# Patient Record
Sex: FEMALE | Race: WHITE | Employment: OTHER | URBAN - METROPOLITAN AREA
[De-identification: names, ages, dates, MRNs, and addresses within clinical notes are randomized per-mention and may not be internally consistent; named-entity substitution may affect disease eponyms.]

---

## 2017-02-16 ENCOUNTER — TELEPHONE (OUTPATIENT)
Dept: INTERNAL MEDICINE CLINIC | Age: 67
End: 2017-02-16

## 2017-02-16 DIAGNOSIS — G25.81 RLS (RESTLESS LEGS SYNDROME): ICD-10-CM

## 2017-02-16 DIAGNOSIS — F40.243 PHOBIA, FLYING: ICD-10-CM

## 2017-02-16 DIAGNOSIS — G89.29 CHRONIC BILATERAL LOW BACK PAIN WITHOUT SCIATICA: ICD-10-CM

## 2017-02-16 DIAGNOSIS — M54.50 CHRONIC BILATERAL LOW BACK PAIN WITHOUT SCIATICA: ICD-10-CM

## 2017-02-17 RX ORDER — ALPRAZOLAM 0.25 MG/1
TABLET ORAL
Qty: 20 TABLET | Refills: 0 | Status: SHIPPED | OUTPATIENT
Start: 2017-02-17 | End: 2017-05-31 | Stop reason: ALTCHOICE

## 2017-02-17 RX ORDER — ALPRAZOLAM 0.25 MG/1
TABLET ORAL
Qty: 10 TABLET | Refills: 0 | Status: SHIPPED | OUTPATIENT
Start: 2017-02-17 | End: 2017-02-17 | Stop reason: CLARIF

## 2017-02-17 RX ORDER — TRAMADOL HYDROCHLORIDE 50 MG/1
TABLET ORAL
Qty: 60 TABLET | Refills: 1 | Status: SHIPPED | OUTPATIENT
Start: 2017-02-17 | End: 2017-05-31 | Stop reason: SDUPTHER

## 2017-05-20 DIAGNOSIS — E78.5 HYPERLIPIDEMIA: ICD-10-CM

## 2017-05-22 RX ORDER — PRAVASTATIN SODIUM 40 MG
TABLET ORAL
Qty: 30 TABLET | Refills: 4 | Status: SHIPPED | OUTPATIENT
Start: 2017-05-22 | End: 2018-02-24 | Stop reason: SDUPTHER

## 2017-05-31 ENCOUNTER — OFFICE VISIT (OUTPATIENT)
Dept: INTERNAL MEDICINE CLINIC | Age: 67
End: 2017-05-31

## 2017-05-31 VITALS
BODY MASS INDEX: 17.87 KG/M2 | RESPIRATION RATE: 12 BRPM | HEART RATE: 83 BPM | WEIGHT: 91 LBS | DIASTOLIC BLOOD PRESSURE: 63 MMHG | HEIGHT: 60 IN | SYSTOLIC BLOOD PRESSURE: 132 MMHG

## 2017-05-31 DIAGNOSIS — M19.042 PRIMARY OSTEOARTHRITIS OF BOTH HANDS: ICD-10-CM

## 2017-05-31 DIAGNOSIS — R73.9 HYPERGLYCEMIA: ICD-10-CM

## 2017-05-31 DIAGNOSIS — Z13.820 SCREENING FOR OSTEOPOROSIS: ICD-10-CM

## 2017-05-31 DIAGNOSIS — Z23 NEED FOR PROPHYLACTIC VACCINATION AGAINST STREPTOCOCCUS PNEUMONIAE (PNEUMOCOCCUS): ICD-10-CM

## 2017-05-31 DIAGNOSIS — M47.816 SPONDYLOSIS OF LUMBAR REGION WITHOUT MYELOPATHY OR RADICULOPATHY: ICD-10-CM

## 2017-05-31 DIAGNOSIS — E78.5 HYPERLIPIDEMIA, UNSPECIFIED HYPERLIPIDEMIA TYPE: ICD-10-CM

## 2017-05-31 DIAGNOSIS — Z82.49 FAMILY HISTORY OF EARLY CAD: ICD-10-CM

## 2017-05-31 DIAGNOSIS — M54.50 CHRONIC BILATERAL LOW BACK PAIN WITHOUT SCIATICA: ICD-10-CM

## 2017-05-31 DIAGNOSIS — G25.81 RLS (RESTLESS LEGS SYNDROME): ICD-10-CM

## 2017-05-31 DIAGNOSIS — M19.041 PRIMARY OSTEOARTHRITIS OF BOTH HANDS: ICD-10-CM

## 2017-05-31 DIAGNOSIS — M85.80 OSTEOPENIA: ICD-10-CM

## 2017-05-31 DIAGNOSIS — Z00.00 ANNUAL PHYSICAL EXAM: Primary | ICD-10-CM

## 2017-05-31 DIAGNOSIS — G89.29 CHRONIC BILATERAL LOW BACK PAIN WITHOUT SCIATICA: ICD-10-CM

## 2017-05-31 LAB
A/G RATIO: 1.6 (ref 1.1–2.2)
ALBUMIN SERPL-MCNC: 4.6 G/DL (ref 3.4–5)
ALP BLD-CCNC: 90 U/L (ref 40–129)
ALT SERPL-CCNC: 14 U/L (ref 10–40)
ANION GAP SERPL CALCULATED.3IONS-SCNC: 14 MMOL/L (ref 3–16)
AST SERPL-CCNC: 21 U/L (ref 15–37)
BILIRUB SERPL-MCNC: <0.2 MG/DL (ref 0–1)
BUN BLDV-MCNC: 21 MG/DL (ref 7–20)
C-REACTIVE PROTEIN: <0.3 MG/L (ref 0–5.1)
CALCIUM SERPL-MCNC: 9.6 MG/DL (ref 8.3–10.6)
CHLORIDE BLD-SCNC: 98 MMOL/L (ref 99–110)
CHOLESTEROL, TOTAL: 244 MG/DL (ref 0–199)
CO2: 30 MMOL/L (ref 21–32)
CREAT SERPL-MCNC: 0.6 MG/DL (ref 0.6–1.2)
GFR AFRICAN AMERICAN: >60
GFR NON-AFRICAN AMERICAN: >60
GLOBULIN: 2.9 G/DL
GLUCOSE BLD-MCNC: 89 MG/DL (ref 70–99)
HDLC SERPL-MCNC: 109 MG/DL (ref 40–60)
LDL CHOLESTEROL CALCULATED: 119 MG/DL
POTASSIUM SERPL-SCNC: 4.4 MMOL/L (ref 3.5–5.1)
SODIUM BLD-SCNC: 142 MMOL/L (ref 136–145)
TOTAL PROTEIN: 7.5 G/DL (ref 6.4–8.2)
TRIGL SERPL-MCNC: 81 MG/DL (ref 0–150)
TSH SERPL DL<=0.05 MIU/L-ACNC: 1.36 UIU/ML (ref 0.27–4.2)
VITAMIN D 25-HYDROXY: 54.7 NG/ML
VLDLC SERPL CALC-MCNC: 16 MG/DL

## 2017-05-31 PROCEDURE — 90670 PCV13 VACCINE IM: CPT | Performed by: INTERNAL MEDICINE

## 2017-05-31 PROCEDURE — G0403 EKG FOR INITIAL PREVENT EXAM: HCPCS | Performed by: INTERNAL MEDICINE

## 2017-05-31 PROCEDURE — G0402 INITIAL PREVENTIVE EXAM: HCPCS | Performed by: INTERNAL MEDICINE

## 2017-05-31 PROCEDURE — G0009 ADMIN PNEUMOCOCCAL VACCINE: HCPCS | Performed by: INTERNAL MEDICINE

## 2017-05-31 RX ORDER — TRAMADOL HYDROCHLORIDE 50 MG/1
TABLET ORAL
Qty: 60 TABLET | Refills: 1 | Status: SHIPPED | OUTPATIENT
Start: 2017-05-31 | End: 2017-08-29 | Stop reason: SDUPTHER

## 2017-05-31 ASSESSMENT — LIFESTYLE VARIABLES
HOW OFTEN DURING THE LAST YEAR HAVE YOU FOUND THAT YOU WERE NOT ABLE TO STOP DRINKING ONCE YOU HAD STARTED: 0
HOW OFTEN DURING THE LAST YEAR HAVE YOU NEEDED AN ALCOHOLIC DRINK FIRST THING IN THE MORNING TO GET YOURSELF GOING AFTER A NIGHT OF HEAVY DRINKING: 0
HAVE YOU OR SOMEONE ELSE BEEN INJURED AS A RESULT OF YOUR DRINKING: 0
HOW OFTEN DO YOU HAVE SIX OR MORE DRINKS ON ONE OCCASION: 0
HOW OFTEN DURING THE LAST YEAR HAVE YOU HAD A FEELING OF GUILT OR REMORSE AFTER DRINKING: 0
AUDIT TOTAL SCORE: 2
HOW MANY STANDARD DRINKS CONTAINING ALCOHOL DO YOU HAVE ON A TYPICAL DAY: 0
HOW OFTEN DO YOU HAVE A DRINK CONTAINING ALCOHOL: 2
AUDIT-C TOTAL SCORE: 2
HAS A RELATIVE, FRIEND, DOCTOR, OR ANOTHER HEALTH PROFESSIONAL EXPRESSED CONCERN ABOUT YOUR DRINKING OR SUGGESTED YOU CUT DOWN: 0
HOW OFTEN DURING THE LAST YEAR HAVE YOU BEEN UNABLE TO REMEMBER WHAT HAPPENED THE NIGHT BEFORE BECAUSE YOU HAD BEEN DRINKING: 0
HOW OFTEN DURING THE LAST YEAR HAVE YOU FAILED TO DO WHAT WAS NORMALLY EXPECTED FROM YOU BECAUSE OF DRINKING: 0

## 2017-05-31 ASSESSMENT — PATIENT HEALTH QUESTIONNAIRE - PHQ9: SUM OF ALL RESPONSES TO PHQ QUESTIONS 1-9: 0

## 2017-05-31 ASSESSMENT — ANXIETY QUESTIONNAIRES: GAD7 TOTAL SCORE: 0

## 2017-06-01 LAB
ESTIMATED AVERAGE GLUCOSE: 116.9 MG/DL
HBA1C MFR BLD: 5.7 %

## 2017-06-16 DIAGNOSIS — F40.243 PHOBIA, FLYING: ICD-10-CM

## 2017-06-19 RX ORDER — ALPRAZOLAM 0.25 MG/1
TABLET ORAL
Qty: 10 TABLET | Refills: 0 | Status: SHIPPED | OUTPATIENT
Start: 2017-06-19 | End: 2017-08-17 | Stop reason: SDUPTHER

## 2017-08-17 ENCOUNTER — TELEPHONE (OUTPATIENT)
Dept: INTERNAL MEDICINE CLINIC | Age: 67
End: 2017-08-17

## 2017-08-17 DIAGNOSIS — F40.243 PHOBIA, FLYING: ICD-10-CM

## 2017-08-18 RX ORDER — ALPRAZOLAM 0.25 MG/1
TABLET ORAL
Qty: 10 TABLET | Refills: 0 | Status: SHIPPED | OUTPATIENT
Start: 2017-08-18 | End: 2018-09-17

## 2017-08-29 DIAGNOSIS — G89.29 CHRONIC BILATERAL LOW BACK PAIN WITHOUT SCIATICA: ICD-10-CM

## 2017-08-29 DIAGNOSIS — G25.81 RLS (RESTLESS LEGS SYNDROME): ICD-10-CM

## 2017-08-29 DIAGNOSIS — M54.50 CHRONIC BILATERAL LOW BACK PAIN WITHOUT SCIATICA: ICD-10-CM

## 2017-08-29 DIAGNOSIS — M47.816 SPONDYLOSIS OF LUMBAR REGION WITHOUT MYELOPATHY OR RADICULOPATHY: ICD-10-CM

## 2017-08-31 RX ORDER — TRAMADOL HYDROCHLORIDE 50 MG/1
TABLET ORAL
Qty: 60 TABLET | Refills: 0 | Status: SHIPPED | OUTPATIENT
Start: 2017-08-31 | End: 2017-10-24 | Stop reason: SDUPTHER

## 2017-10-05 ENCOUNTER — TELEPHONE (OUTPATIENT)
Dept: INTERNAL MEDICINE CLINIC | Age: 67
End: 2017-10-05
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
## 2017-10-06 ENCOUNTER — OFFICE VISIT (OUTPATIENT)
Dept: INTERNAL MEDICINE CLINIC | Age: 67
End: 2017-10-06

## 2017-10-06 VITALS — SYSTOLIC BLOOD PRESSURE: 120 MMHG | DIASTOLIC BLOOD PRESSURE: 80 MMHG | BODY MASS INDEX: 18.27 KG/M2 | WEIGHT: 92 LBS

## 2017-10-06 DIAGNOSIS — S39.012A LUMBAR STRAIN, INITIAL ENCOUNTER: ICD-10-CM

## 2017-10-06 DIAGNOSIS — M99.03 LUMBAR REGION SOMATIC DYSFUNCTION: ICD-10-CM

## 2017-10-06 DIAGNOSIS — S16.1XXA CERVICAL STRAIN, INITIAL ENCOUNTER: Primary | ICD-10-CM

## 2017-10-06 PROCEDURE — 99213 OFFICE O/P EST LOW 20 MIN: CPT | Performed by: INTERNAL MEDICINE
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
## 2017-10-24 DIAGNOSIS — G89.29 CHRONIC BILATERAL LOW BACK PAIN WITHOUT SCIATICA: ICD-10-CM

## 2017-10-24 DIAGNOSIS — G25.81 RLS (RESTLESS LEGS SYNDROME): ICD-10-CM

## 2017-10-24 DIAGNOSIS — M47.816 SPONDYLOSIS OF LUMBAR REGION WITHOUT MYELOPATHY OR RADICULOPATHY: ICD-10-CM

## 2017-10-24 DIAGNOSIS — M54.50 CHRONIC BILATERAL LOW BACK PAIN WITHOUT SCIATICA: ICD-10-CM

## 2017-10-24 RX ORDER — TRAMADOL HYDROCHLORIDE 50 MG/1
TABLET ORAL
Qty: 60 TABLET | Refills: 0 | Status: SHIPPED | OUTPATIENT
Start: 2017-10-24 | End: 2017-12-12 | Stop reason: SDUPTHER

## 2017-12-12 ENCOUNTER — OFFICE VISIT (OUTPATIENT)
Dept: INTERNAL MEDICINE CLINIC | Age: 67
End: 2017-12-12

## 2017-12-12 VITALS
BODY MASS INDEX: 18.26 KG/M2 | RESPIRATION RATE: 12 BRPM | HEART RATE: 73 BPM | WEIGHT: 93 LBS | SYSTOLIC BLOOD PRESSURE: 130 MMHG | HEIGHT: 60 IN | DIASTOLIC BLOOD PRESSURE: 72 MMHG

## 2017-12-12 DIAGNOSIS — E78.5 HYPERLIPIDEMIA, UNSPECIFIED HYPERLIPIDEMIA TYPE: ICD-10-CM

## 2017-12-12 DIAGNOSIS — M47.816 SPONDYLOSIS OF LUMBAR REGION WITHOUT MYELOPATHY OR RADICULOPATHY: ICD-10-CM

## 2017-12-12 DIAGNOSIS — G89.29 CHRONIC BILATERAL LOW BACK PAIN WITHOUT SCIATICA: Primary | ICD-10-CM

## 2017-12-12 DIAGNOSIS — Z23 FLU VACCINE NEED: ICD-10-CM

## 2017-12-12 DIAGNOSIS — M54.50 CHRONIC BILATERAL LOW BACK PAIN WITHOUT SCIATICA: Primary | ICD-10-CM

## 2017-12-12 DIAGNOSIS — G25.81 RLS (RESTLESS LEGS SYNDROME): ICD-10-CM

## 2017-12-12 PROCEDURE — 99214 OFFICE O/P EST MOD 30 MIN: CPT | Performed by: INTERNAL MEDICINE

## 2017-12-12 PROCEDURE — G0008 ADMIN INFLUENZA VIRUS VAC: HCPCS | Performed by: INTERNAL MEDICINE

## 2017-12-12 PROCEDURE — 90662 IIV NO PRSV INCREASED AG IM: CPT | Performed by: INTERNAL MEDICINE

## 2017-12-12 RX ORDER — TRAMADOL HYDROCHLORIDE 50 MG/1
TABLET ORAL
Qty: 60 TABLET | Refills: 2 | Status: SHIPPED | OUTPATIENT
Start: 2017-12-12 | End: 2018-05-26 | Stop reason: SDUPTHER

## 2017-12-12 NOTE — PATIENT INSTRUCTIONS
Patient Education        Back Care and Preventing Injuries: Care Instructions  Your Care Instructions    You can hurt your back doing many everyday activities: lifting a heavy box, bending down to garden, exercising at the gym, and even getting out of bed. But you can keep your back strong and healthy by doing some exercises. You also can follow a few tips for sitting, sleeping, and lifting to avoid hurting your back again. Talk to your doctor before you start an exercise program. Ask for help if you want to learn more about keeping your back healthy. Follow-up care is a key part of your treatment and safety. Be sure to make and go to all appointments, and call your doctor if you are having problems. It's also a good idea to know your test results and keep a list of the medicines you take. How can you care for yourself at home? · Stay at a healthy weight to avoid strain on your lower back. · Do not smoke. Smoking increases the risk of osteoporosis, which weakens the spine. If you need help quitting, talk to your doctor about stop-smoking programs and medicines. These can increase your chances of quitting for good. · Make sure you sleep in a position that maintains your back's normal curves and on a mattress that feels comfortable. Sleep on your side with a pillow between your knees, or sleep on your back with a pillow under your knees. These positions can reduce strain on your back. · When you get out of bed, lie on your side and bend both knees. Drop your feet over the edge of the bed as you push up with both arms. Scoot to the edge of the bed. Make sure your feet are in line with your rear end (buttocks), and then stand up. · If you must stand for a long time, put one foot on a stool, ledge, or box. Exercise to strengthen your back and other muscles  · Get at least 30 minutes of exercise on most days of the week. Walking is a good choice.  You also may want to do other activities, such as running,

## 2017-12-12 NOTE — PROGRESS NOTES
[facility]) Physicians  Internal Medicine  Patient Encounter  Noe Traore D.O., [facility]        Chief Complaint   Patient presents with   [place]    Medication Check       HPI: 74 y.o. female seen today for follow up regarding the status of her current chronic medical problems listed below along with the medication check. She went to urgent care over the weekend for a sinus infection. Strep and Flu tests were Neg. She was given a Z-suri. Chronic low back pain-- pain generators Lumbar facet arthropathy. Patient continues to do home back exercises on a regular basis along with a daily tramadol. She also does Yoga. She also uses Aleve on occasion which provides good relief. She denies any problems with radiculopathy, bladder or bowel incontinence. OARRS appropriate 11/22/2016. RLS-- Somewhere along the way tramadol was given to her for pain, but helped her restless legs. Interestingly, the tramadol does help her restless legs. She takes this once nightly. She's had no problems with misuse or abuse or diversion. Hyperlipidemia:    Lab Results   Component Value Date    LDLCALC 119 (H) 05/31/2017   agent is on statin therapy due to her cardiovascular risk. Father had coronary disease early in life. She denies any new development of myalgias, unexplained muscle weakness or muscle cramping. She does try to adhere to a low-fat diet.   He will have lab done at physical.            Past Medical History:   Diagnosis Date    Arthropathy of cervical facet joint     Cervical spondylosis     Chronic bilateral low back pain without sciatica 11/28/2016    Hyperlipidemia     Lumbar facet arthropathy     Osteopenia     PONV (postoperative nausea and vomiting)     RLS (restless legs syndrome)     Scoliosis     Spondylolisthesis        Review of Systems - As per HPI      OBJECTIVE:  /72   Pulse 73   Resp 12   Ht 4' 11.5\" (1.511 m)   Wt 93 lb (42.2 kg)   BMI 18.47 kg/m²   GEN: NAD, A&O, Non-toxic  HEENT: NC/AT, SUZE, EOMI, Oral cavity Clear,  TM's NL, throat normal.  Nasal cavity is clear without purulent discharge  NECK: Supple. No thyromegaly. No JVD. LYMPH: No C/SC nodes. CV: Regular rhythm. Rate normal.  No murmurs. PULM: CTA  EXT: No edema. GI: Abdomen is soft, NT  NEURO: No focal or lateralizing deficits. Cranial nerves II through XII are intact  VASC:  No carotid bruits. Pulses symmetric  MS:  Increased soft tissue tone of the Lumbar paraspinals. Gait NL. No reproducible TTP. ASSESSMENT/PLAN:    1. Chronic bilateral low back pain without sciatica  Condition is stable and well-controlled  Continue home back stretching program  Continue tramadol once daily  Continue Aleve as needed  Continue to observe for worsening pain or radiculopathy    2. RLS (restless legs syndrome)  Condition is well controlled  Continue tramadol nightly  Continue to observe for worsening RLS symptoms    3. Hyperlipidemia, unspecified hyperlipidemia type  Condition is well controlled  Continue pravastatin  Continue aggressive lifestyle approach with low-fat food options and exercise. 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 Flu vaccine need    - INFLUENZA, HIGH DOSE, 65 YRS +, IM, PF, PREFILL SYR, 0.5ML (FLUZONE HD)        Controlled Substances Monitoring:     Attestation: The Prescription Monitoring Report for this patient was reviewed today. (Xavier Church, DO)  Documentation: No signs of potential drug abuse or diversion identified. , Possible medication side effects, risk of tolerance and/or dependence, and alternative treatments discussed.  ([id number], DO)

## 2018-05-26 DIAGNOSIS — M47.816 SPONDYLOSIS OF LUMBAR REGION WITHOUT MYELOPATHY OR RADICULOPATHY: ICD-10-CM

## 2018-05-26 DIAGNOSIS — G89.29 CHRONIC BILATERAL LOW BACK PAIN WITHOUT SCIATICA: ICD-10-CM

## 2018-05-26 DIAGNOSIS — G25.81 RLS (RESTLESS LEGS SYNDROME): ICD-10-CM

## 2018-05-26 DIAGNOSIS — M54.50 CHRONIC BILATERAL LOW BACK PAIN WITHOUT SCIATICA: ICD-10-CM

## 2018-05-29 RX ORDER — TRAMADOL HYDROCHLORIDE 50 MG/1
TABLET ORAL
Qty: 60 TABLET | Refills: 1 | Status: SHIPPED | OUTPATIENT
Start: 2018-05-29 | End: 2018-08-26 | Stop reason: SDUPTHER

## 2018-06-12 ENCOUNTER — OFFICE VISIT (OUTPATIENT)
Dept: INTERNAL MEDICINE CLINIC | Age: 68
End: 2018-06-12

## 2018-06-12 VITALS
HEART RATE: 68 BPM | SYSTOLIC BLOOD PRESSURE: 120 MMHG | DIASTOLIC BLOOD PRESSURE: 62 MMHG | RESPIRATION RATE: 12 BRPM | BODY MASS INDEX: 18.55 KG/M2 | WEIGHT: 92 LBS | HEIGHT: 59 IN

## 2018-06-12 DIAGNOSIS — E78.5 HYPERLIPIDEMIA, UNSPECIFIED HYPERLIPIDEMIA TYPE: ICD-10-CM

## 2018-06-12 DIAGNOSIS — M41.9 SCOLIOSIS OF THORACOLUMBAR SPINE, UNSPECIFIED SCOLIOSIS TYPE: ICD-10-CM

## 2018-06-12 DIAGNOSIS — Z23 NEED FOR PROPHYLACTIC VACCINATION AGAINST STREPTOCOCCUS PNEUMONIAE (PNEUMOCOCCUS): ICD-10-CM

## 2018-06-12 DIAGNOSIS — Z78.0 POSTMENOPAUSAL: ICD-10-CM

## 2018-06-12 DIAGNOSIS — M85.80 OSTEOPENIA, UNSPECIFIED LOCATION: ICD-10-CM

## 2018-06-12 DIAGNOSIS — Z23 NEED FOR PROPHYLACTIC VACCINATION AND INOCULATION AGAINST VARICELLA: ICD-10-CM

## 2018-06-12 DIAGNOSIS — Z00.00 ROUTINE GENERAL MEDICAL EXAMINATION AT A HEALTH CARE FACILITY: Primary | ICD-10-CM

## 2018-06-12 DIAGNOSIS — R73.01 IMPAIRED FASTING GLUCOSE: ICD-10-CM

## 2018-06-12 LAB
A/G RATIO: 2 (ref 1.1–2.2)
ALBUMIN SERPL-MCNC: 4.8 G/DL (ref 3.4–5)
ALP BLD-CCNC: 95 U/L (ref 40–129)
ALT SERPL-CCNC: 16 U/L (ref 10–40)
ANION GAP SERPL CALCULATED.3IONS-SCNC: 13 MMOL/L (ref 3–16)
AST SERPL-CCNC: 26 U/L (ref 15–37)
BASOPHILS ABSOLUTE: 0 K/UL (ref 0–0.2)
BASOPHILS RELATIVE PERCENT: 0.7 %
BILIRUB SERPL-MCNC: 0.3 MG/DL (ref 0–1)
BUN BLDV-MCNC: 19 MG/DL (ref 7–20)
CALCIUM SERPL-MCNC: 10.1 MG/DL (ref 8.3–10.6)
CHLORIDE BLD-SCNC: 100 MMOL/L (ref 99–110)
CHOLESTEROL, TOTAL: 250 MG/DL (ref 0–199)
CO2: 30 MMOL/L (ref 21–32)
CREAT SERPL-MCNC: 0.7 MG/DL (ref 0.6–1.2)
EOSINOPHILS ABSOLUTE: 0.2 K/UL (ref 0–0.6)
EOSINOPHILS RELATIVE PERCENT: 3.1 %
GFR AFRICAN AMERICAN: >60
GFR NON-AFRICAN AMERICAN: >60
GLOBULIN: 2.4 G/DL
GLUCOSE BLD-MCNC: 94 MG/DL (ref 70–99)
HCT VFR BLD CALC: 41.6 % (ref 36–48)
HDLC SERPL-MCNC: 131 MG/DL (ref 40–60)
HEMOGLOBIN: 14.4 G/DL (ref 12–16)
LDL CHOLESTEROL CALCULATED: 96 MG/DL
LYMPHOCYTES ABSOLUTE: 1.5 K/UL (ref 1–5.1)
LYMPHOCYTES RELATIVE PERCENT: 23.1 %
MCH RBC QN AUTO: 32.9 PG (ref 26–34)
MCHC RBC AUTO-ENTMCNC: 34.5 G/DL (ref 31–36)
MCV RBC AUTO: 95.2 FL (ref 80–100)
MONOCYTES ABSOLUTE: 0.5 K/UL (ref 0–1.3)
MONOCYTES RELATIVE PERCENT: 7 %
NEUTROPHILS ABSOLUTE: 4.4 K/UL (ref 1.7–7.7)
NEUTROPHILS RELATIVE PERCENT: 66.1 %
PDW BLD-RTO: 14 % (ref 12.4–15.4)
PLATELET # BLD: 325 K/UL (ref 135–450)
PMV BLD AUTO: 8 FL (ref 5–10.5)
POTASSIUM SERPL-SCNC: 4 MMOL/L (ref 3.5–5.1)
RBC # BLD: 4.37 M/UL (ref 4–5.2)
SODIUM BLD-SCNC: 143 MMOL/L (ref 136–145)
TOTAL PROTEIN: 7.2 G/DL (ref 6.4–8.2)
TRIGL SERPL-MCNC: 113 MG/DL (ref 0–150)
TSH SERPL DL<=0.05 MIU/L-ACNC: 1.48 UIU/ML (ref 0.27–4.2)
VLDLC SERPL CALC-MCNC: 23 MG/DL
WBC # BLD: 6.6 K/UL (ref 4–11)

## 2018-06-12 PROCEDURE — G0438 PPPS, INITIAL VISIT: HCPCS | Performed by: INTERNAL MEDICINE

## 2018-06-12 PROCEDURE — 90732 PPSV23 VACC 2 YRS+ SUBQ/IM: CPT | Performed by: INTERNAL MEDICINE

## 2018-06-12 PROCEDURE — G0009 ADMIN PNEUMOCOCCAL VACCINE: HCPCS | Performed by: INTERNAL MEDICINE

## 2018-06-12 RX ORDER — PRAVASTATIN SODIUM 20 MG
20 TABLET ORAL NIGHTLY
COMMUNITY
End: 2019-02-21 | Stop reason: SDUPTHER

## 2018-06-12 ASSESSMENT — LIFESTYLE VARIABLES
HOW OFTEN DO YOU HAVE SIX OR MORE DRINKS ON ONE OCCASION: 0
HOW OFTEN DURING THE LAST YEAR HAVE YOU FOUND THAT YOU WERE NOT ABLE TO STOP DRINKING ONCE YOU HAD STARTED: 0
HOW OFTEN DURING THE LAST YEAR HAVE YOU BEEN UNABLE TO REMEMBER WHAT HAPPENED THE NIGHT BEFORE BECAUSE YOU HAD BEEN DRINKING: 0
HAS A RELATIVE, FRIEND, DOCTOR, OR ANOTHER HEALTH PROFESSIONAL EXPRESSED CONCERN ABOUT YOUR DRINKING OR SUGGESTED YOU CUT DOWN: 0
HAVE YOU OR SOMEONE ELSE BEEN INJURED AS A RESULT OF YOUR DRINKING: 0
HOW OFTEN DURING THE LAST YEAR HAVE YOU HAD A FEELING OF GUILT OR REMORSE AFTER DRINKING: 0
HOW OFTEN DURING THE LAST YEAR HAVE YOU FAILED TO DO WHAT WAS NORMALLY EXPECTED FROM YOU BECAUSE OF DRINKING: 0
HOW OFTEN DURING THE LAST YEAR HAVE YOU NEEDED AN ALCOHOLIC DRINK FIRST THING IN THE MORNING TO GET YOURSELF GOING AFTER A NIGHT OF HEAVY DRINKING: 0
HOW OFTEN DO YOU HAVE A DRINK CONTAINING ALCOHOL: 3
HOW MANY STANDARD DRINKS CONTAINING ALCOHOL DO YOU HAVE ON A TYPICAL DAY: 0
AUDIT-C TOTAL SCORE: 3
AUDIT TOTAL SCORE: 3

## 2018-06-12 ASSESSMENT — PATIENT HEALTH QUESTIONNAIRE - PHQ9: SUM OF ALL RESPONSES TO PHQ QUESTIONS 1-9: 0

## 2018-06-12 ASSESSMENT — ANXIETY QUESTIONNAIRES: GAD7 TOTAL SCORE: 0

## 2018-06-13 LAB
ESTIMATED AVERAGE GLUCOSE: 111.2 MG/DL
HBA1C MFR BLD: 5.5 %

## 2018-06-27 ENCOUNTER — HOSPITAL ENCOUNTER (OUTPATIENT)
Dept: GENERAL RADIOLOGY | Age: 68
Discharge: HOME | End: 2018-06-27
Attending: INTERNAL MEDICINE | Admitting: INTERNAL MEDICINE

## 2018-06-27 DIAGNOSIS — Z78.0 POSTMENOPAUSAL: ICD-10-CM

## 2018-06-27 DIAGNOSIS — M85.80 OSTEOPENIA, UNSPECIFIED LOCATION: ICD-10-CM

## 2018-06-27 DIAGNOSIS — M85.80 OTHER SPECIFIED DISORDERS OF BONE DENSITY AND STRUCTURE, UNSPECIFIED SITE (CODE): ICD-10-CM

## 2018-06-29 ENCOUNTER — HOSPITAL ENCOUNTER (OUTPATIENT)
Dept: GENERAL RADIOLOGY | Age: 68
Discharge: HOME | End: 2018-06-29
Attending: INTERNAL MEDICINE | Admitting: INTERNAL MEDICINE

## 2018-06-29 DIAGNOSIS — M85.80 OTHER SPECIFIED DISORDERS OF BONE DENSITY AND STRUCTURE, UNSPECIFIED SITE (CODE): ICD-10-CM

## 2018-06-29 DIAGNOSIS — Z78.0 POSTMENOPAUSAL: ICD-10-CM

## 2018-07-02 ENCOUNTER — PATIENT MESSAGE (OUTPATIENT)
Dept: INTERNAL MEDICINE CLINIC | Age: 68
End: 2018-07-02

## 2018-07-02 DIAGNOSIS — M81.0 OSTEOPOROSIS, POSTMENOPAUSAL: Primary | ICD-10-CM

## 2018-07-02 DIAGNOSIS — M85.80 OSTEOPENIA, UNSPECIFIED LOCATION: ICD-10-CM

## 2018-07-02 NOTE — TELEPHONE ENCOUNTER
From: Katia Gilmore  Sent: 7/2/2018 1:33 PM EDT  To: [facility]  Subject: RE:    Is there a holistic approach to this without going on osteoporosis meds? I am open to seeing a specialist in this field.    ----- Message -----  From: Blima Apley, DO  Sent: 7/2/18, 8:02 AM  To: Katia Gilmore  Subject:     Rosa M,  Your bone density test shows significant worsening of bone density. You have osteoporosis and osteopenia. He should reconsider medical therapy to help reduce your risk of fracture.   Call or e-mail with questions   Dr. Garcia Pore

## 2018-07-27 ENCOUNTER — TELEPHONE (OUTPATIENT)
Dept: INTERNAL MEDICINE CLINIC | Age: 68
End: 2018-07-27

## 2018-08-26 DIAGNOSIS — M47.816 SPONDYLOSIS OF LUMBAR REGION WITHOUT MYELOPATHY OR RADICULOPATHY: ICD-10-CM

## 2018-08-26 DIAGNOSIS — G89.29 CHRONIC BILATERAL LOW BACK PAIN WITHOUT SCIATICA: ICD-10-CM

## 2018-08-26 DIAGNOSIS — M54.50 CHRONIC BILATERAL LOW BACK PAIN WITHOUT SCIATICA: ICD-10-CM

## 2018-08-26 DIAGNOSIS — G25.81 RLS (RESTLESS LEGS SYNDROME): ICD-10-CM

## 2018-08-28 RX ORDER — TRAMADOL HYDROCHLORIDE 50 MG/1
TABLET ORAL
Qty: 60 TABLET | Refills: 0 | Status: SHIPPED | OUTPATIENT
Start: 2018-08-28 | End: 2018-10-03 | Stop reason: SDUPTHER

## 2018-09-17 ENCOUNTER — OFFICE VISIT (OUTPATIENT)
Dept: ENDOCRINOLOGY | Age: 68
End: 2018-09-17

## 2018-09-17 VITALS
HEIGHT: 59 IN | SYSTOLIC BLOOD PRESSURE: 124 MMHG | BODY MASS INDEX: 18.59 KG/M2 | WEIGHT: 92.2 LBS | DIASTOLIC BLOOD PRESSURE: 88 MMHG | OXYGEN SATURATION: 97 % | HEART RATE: 87 BPM

## 2018-09-17 DIAGNOSIS — M81.0 AGE-RELATED OSTEOPOROSIS WITHOUT CURRENT PATHOLOGICAL FRACTURE: Primary | ICD-10-CM

## 2018-09-17 DIAGNOSIS — Z84.89 FAMILY HISTORY OF HIP FRACTURE: ICD-10-CM

## 2018-09-17 DIAGNOSIS — K21.9 GASTROESOPHAGEAL REFLUX DISEASE WITHOUT ESOPHAGITIS: ICD-10-CM

## 2018-09-17 PROCEDURE — 99215 OFFICE O/P EST HI 40 MIN: CPT | Performed by: INTERNAL MEDICINE

## 2018-09-17 ASSESSMENT — PATIENT HEALTH QUESTIONNAIRE - PHQ9
2. FEELING DOWN, DEPRESSED OR HOPELESS: 0
1. LITTLE INTEREST OR PLEASURE IN DOING THINGS: 0
SUM OF ALL RESPONSES TO PHQ QUESTIONS 1-9: 0
SUM OF ALL RESPONSES TO PHQ QUESTIONS 1-9: 0
SUM OF ALL RESPONSES TO PHQ9 QUESTIONS 1 & 2: 0

## 2018-09-17 NOTE — PROGRESS NOTES
claudication, no palpitations  Gastrointestinal: no abdominal pain, no nausea, no vomiting, no diarrhea, no constipation, no dysphagia, had heartburn, no bloating  Genitourinary: no dysuria, no urinary incontinence, no urinary hesitancy, no urinary frequency, no feelings of urinary urgency, no nocturia  Musculoskeletal: no joint swelling, no joint stiffness, no joint pain, no muscle cramps, no muscle pain, no bone pain  Integument/Breast: no hair loss, no skin rashes, no skin lesions, no itching, no dry skin  Neurological: no numbness, no tingling, no weakness, no confusion, no headaches, no dizziness, no fainting, no tremors, no decrease in memory, no balance problems  Psychiatric: no anxiety, no depression, no insomnia  Hematologic/Lymphatic: no tendency for easy bleeding, no swollen lymph nodes, has tendency for easy bruising  Immunology: has seasonal allergies, no frequent infections, no frequent illnesses  Endocrine: no temperature intolerance    /88 (Site: Left Upper Arm, Position: Sitting, Cuff Size: Medium Adult)   Pulse 87   Ht 4' 11\" (1.499 m)   Wt 92 lb 3.2 oz (41.8 kg)   SpO2 97%   BMI 18.62 kg/m²    Wt Readings from Last 3 Encounters:   09/17/18 92 lb 3.2 oz (41.8 kg)   06/12/18 92 lb (41.7 kg)   12/12/17 93 lb (42.2 kg)     Body mass index is 18.62 kg/m².     OBJECTIVE:  Constitutional: no acute distress, well appearing and well nourished  Psychiatric: oriented to person, place and time, judgement and insight and normal, recent and remote memory and intact and mood and affect are normal  Skin: skin and subcutaneous tissue is normal without mass, normal turgor  Head and Face: examination of head and face revealed no abnormalities  Eyes: no lid or conjunctival swelling, erythema or discharge, pupils are normal, equal, round, reactive to light  Ears/Nose: external inspection of ears and nose revealed no abnormalities, hearing is grossly normal  Oropharynx/Mouth/Face: lips, tongue and gums are normal with no lesions, the voice quality was normal  Neck: neck is supple and symmetric, with midline trachea and no masses, thyroid is normal  Lymphatics: normal cervical lymph nodes, normal supraclavicular nodes  Pulmonary: no increased work of breathing or signs of respiratory distress, lungs are clear to auscultation  Cardiovascular: normal heart rate and rhythm, normal S1 and S2, no murmurs and pedal pulses and 2+ bilaterally, No edema  Abdomen: abdomen is soft, non-tender with no masses  Musculoskeletal: normal gait and station and exam of the digits and nails are normal  Neurological: normal coordination and normal general cortical function      Lab Review:    Lab Results   Component Value Date    WBC 6.6 06/12/2018    HGB 14.4 06/12/2018    HCT 41.6 06/12/2018    MCV 95.2 06/12/2018     06/12/2018     Lab Results   Component Value Date     06/12/2018    K 4.0 06/12/2018     06/12/2018    CO2 30 06/12/2018    BUN 19 06/12/2018    CREATININE 0.7 06/12/2018    GLUCOSE 94 06/12/2018    CALCIUM 10.1 06/12/2018    PROT 7.2 06/12/2018    PROT 7.2 03/08/2013    LABALBU 4.8 06/12/2018    BILITOT 0.3 06/12/2018    ALKPHOS 95 06/12/2018    AST 26 06/12/2018    ALT 16 06/12/2018    LABGLOM >60 06/12/2018    GFRAA >60 06/12/2018    GFRAA >60 03/08/2013    AGRATIO 2.0 06/12/2018    GLOB 2.4 06/12/2018     Lab Results   Component Value Date    TSH 1.48 06/12/2018     Lab Results   Component Value Date    LABA1C 5.5 06/12/2018     Lab Results   Component Value Date    .2 06/12/2018     Lab Results   Component Value Date    CHOL 250 06/12/2018     Lab Results   Component Value Date    TRIG 113 06/12/2018     Lab Results   Component Value Date     06/12/2018     Lab Results   Component Value Date    LDLCALC 96 06/12/2018     Lab Results   Component Value Date    LABVLDL 23 06/12/2018     No results found for: CHOLHDLRATIO  No results found for: LABMICR, [id number]  Lab Results   Component Value Date    VITD25 54.7 05/31/2017        ASSESSMENT/PLAN:  1. Age-related osteoporosis without current pathological fracture  Call with calcium and vitamin D supplements. Calculate calcium PO intake. Has worsening of bone density.  - Calcium, 24 HR Urine; Future  - Sodium, urine, 24 hour; Future  - Creatinine Serum for Clearance; Future  - PTH, Intact; Future  - Comprehensive Metabolic Panel; Future  - Electrophoresis Protein, Serum without Reflex to Immunofixation; Future    2. Gastroesophageal reflux disease without esophagitis  Follow. Not a candidate for oral biphosphonates. 3. Family history of hip fracture  Mother had atypical femur fracture. Reviewed and/or ordered clinical lab results Yes  Reviewed and/or ordered radiology tests Yes   Reviewed and/or ordered other diagnostic tests No  Discussed test results with performing physician No  Independently reviewed image, tracing, or specimen No  Made a decision to obtain old records No  Reviewed and summarized old records Yes  Osteopenia, now osteoporosis. Obtained history from other than patient No    Yordan Manriquez was counseled regarding symptoms of osteoporosis diagnosis, course and complications of disease if inadequately treated, side effects of medications, diagnosis, treatment options, and prognosis, risks, benefits, complications, and alternatives of treatment, labs, imaging and other studies and treatment targets and goals. She understands instructions and counseling. Total visit time 45 min, >50% was counseling time. Return in about 1 month (around 10/17/2018) for osteoporosis.

## 2018-09-28 DIAGNOSIS — M81.0 AGE-RELATED OSTEOPOROSIS WITHOUT CURRENT PATHOLOGICAL FRACTURE: ICD-10-CM

## 2018-09-28 LAB
24HR URINE VOLUME (ML): 1700 ML
A/G RATIO: 1.8 (ref 1.1–2.2)
ALBUMIN SERPL-MCNC: 4.6 G/DL (ref 3.4–5)
ALP BLD-CCNC: 95 U/L (ref 40–129)
ALT SERPL-CCNC: 16 U/L (ref 10–40)
ANION GAP SERPL CALCULATED.3IONS-SCNC: 14 MMOL/L (ref 3–16)
AST SERPL-CCNC: 24 U/L (ref 15–37)
BILIRUB SERPL-MCNC: <0.2 MG/DL (ref 0–1)
BUN BLDV-MCNC: 21 MG/DL (ref 7–20)
CALCIUM 24 HOUR URINE: 206 MG/24 HR (ref 42–353)
CALCIUM SERPL-MCNC: 9.7 MG/DL (ref 8.3–10.6)
CHLORIDE BLD-SCNC: 100 MMOL/L (ref 99–110)
CO2: 28 MMOL/L (ref 21–32)
CREAT SERPL-MCNC: 0.6 MG/DL (ref 0.6–1.2)
CREATININE 24 HOUR URINE: 0.8 G/24HR (ref 0.6–1.5)
GLOBULIN: 2.5 G/DL
GLUCOSE BLD-MCNC: 93 MG/DL (ref 70–99)
PARATHYROID HORMONE INTACT: 41.7 PG/ML (ref 14–72)
POTASSIUM SERPL-SCNC: 4.4 MMOL/L (ref 3.5–5.1)
SODIUM 24 HOUR URINE: 112 MMOL/24 HR (ref 40–220)
SODIUM BLD-SCNC: 142 MMOL/L (ref 136–145)
TOTAL PROTEIN: 7.1 G/DL (ref 6.4–8.2)

## 2018-09-29 LAB
CREAT SERPL-MCNC: 0.6 MG/DL (ref 0.6–1.2)
GFR AFRICAN AMERICAN: >60
GFR NON-AFRICAN AMERICAN: >60

## 2018-10-01 LAB
ALBUMIN SERPL-MCNC: 3.7 G/DL (ref 3.1–4.9)
ALPHA-1-GLOBULIN: 0.3 G/DL (ref 0.2–0.4)
ALPHA-2-GLOBULIN: 0.9 G/DL (ref 0.4–1.1)
BETA GLOBULIN: 1.2 G/DL (ref 0.9–1.6)
GAMMA GLOBULIN: 1 G/DL (ref 0.6–1.8)
SPE/IFE INTERPRETATION: NORMAL

## 2018-10-03 ENCOUNTER — TELEPHONE (OUTPATIENT)
Dept: INTERNAL MEDICINE CLINIC | Age: 68
End: 2018-10-03

## 2018-10-03 DIAGNOSIS — M47.816 SPONDYLOSIS OF LUMBAR REGION WITHOUT MYELOPATHY OR RADICULOPATHY: ICD-10-CM

## 2018-10-03 DIAGNOSIS — G25.81 RLS (RESTLESS LEGS SYNDROME): ICD-10-CM

## 2018-10-03 DIAGNOSIS — M54.50 CHRONIC BILATERAL LOW BACK PAIN WITHOUT SCIATICA: ICD-10-CM

## 2018-10-03 DIAGNOSIS — G89.29 CHRONIC BILATERAL LOW BACK PAIN WITHOUT SCIATICA: ICD-10-CM

## 2018-10-04 DIAGNOSIS — F40.243 ANXIETY WITH FLYING: Primary | ICD-10-CM

## 2018-10-04 RX ORDER — TRAMADOL HYDROCHLORIDE 50 MG/1
TABLET ORAL
Qty: 60 TABLET | Refills: 0 | Status: SHIPPED | OUTPATIENT
Start: 2018-10-04 | End: 2018-12-03 | Stop reason: SDUPTHER

## 2018-10-05 RX ORDER — ALPRAZOLAM 0.25 MG/1
TABLET ORAL
Qty: 10 TABLET | Refills: 0 | Status: SHIPPED | OUTPATIENT
Start: 2018-10-05 | End: 2019-02-04 | Stop reason: SDUPTHER

## 2018-10-29 ENCOUNTER — OFFICE VISIT (OUTPATIENT)
Dept: ENDOCRINOLOGY | Age: 68
End: 2018-10-29
Payer: MEDICARE

## 2018-10-29 VITALS
BODY MASS INDEX: 18.63 KG/M2 | HEART RATE: 78 BPM | WEIGHT: 92.4 LBS | DIASTOLIC BLOOD PRESSURE: 72 MMHG | OXYGEN SATURATION: 97 % | HEIGHT: 59 IN | SYSTOLIC BLOOD PRESSURE: 116 MMHG

## 2018-10-29 DIAGNOSIS — Z84.89 FAMILY HISTORY OF HIP FRACTURE: ICD-10-CM

## 2018-10-29 DIAGNOSIS — M81.0 AGE-RELATED OSTEOPOROSIS WITHOUT CURRENT PATHOLOGICAL FRACTURE: Primary | ICD-10-CM

## 2018-10-29 DIAGNOSIS — K21.9 GASTROESOPHAGEAL REFLUX DISEASE WITHOUT ESOPHAGITIS: ICD-10-CM

## 2018-10-29 PROCEDURE — 99214 OFFICE O/P EST MOD 30 MIN: CPT | Performed by: INTERNAL MEDICINE

## 2018-10-29 NOTE — PROGRESS NOTES
measured at 0.54 g/cm2   corresponding to a T-score of -2.8 and a Z-score of -0.2.  This is within the   osteoporosis range by [facility] criteria.       Since prior exam, -7.4 % change in the femoral neck and -5.7% change in the   total hip, which are significant.       RIGHT HIP:       The bone mineral density in the total hip is measured at 0.668 g/cm2   corresponding to a T-score of -2.2 and a Z-score of -1.  This is within the   osteopenia range by [facility] criteria.       The bone mineral density of the femoral neck is measured at 0.581 g/cm2   corresponding to a T-score of -2.4 and a Z-score of -0.8.  This is within the   osteopenia range by [facility] criteria.       Since prior exam, -6.4 % change in the total hip, which is significant.           Impression   Osteoporosis by WHO criteria.        Past Medical History:   Diagnosis Date    Arthropathy of cervical facet joint     Cervical spondylosis     Chronic bilateral low back pain without sciatica 11/28/2016    Hyperlipidemia     Impaired fasting glucose 6/12/2018    Lumbar facet arthropathy     Osteopenia     PONV (postoperative nausea and vomiting)     RLS (restless legs syndrome)     Scoliosis     Spondylolisthesis      Patient Active Problem List    Diagnosis Date Noted    Gastroesophageal reflux disease without esophagitis 09/17/2018    Family history of hip fracture 09/17/2018    Impaired fasting glucose 06/12/2018    Chronic bilateral low back pain without sciatica 11/28/2016    Spondylolisthesis     Lumbar facet arthropathy     Hyperlipidemia 08/26/2011    DJD (degenerative joint disease), cervical 10/13/2010    Cervical radiculopathy 09/10/2010    DJD (degenerative joint disease), lumbar 09/10/2010    RLS (restless legs syndrome) 01/12/2010    Age-related osteoporosis without current pathological fracture 01/12/2010    Scoliosis of thoracolumbar spine 01/12/2010     Past Surgical History:   Procedure Laterality Date    CYSTOSCOPY      hearing is grossly normal  Oropharynx/Mouth/Face: lips, tongue and gums are normal with no lesions, the voice quality was normal  Neck: neck is supple and symmetric, with midline trachea and no masses, thyroid is normal  Lymphatics: normal cervical lymph nodes, normal supraclavicular nodes  Pulmonary: no increased work of breathing or signs of respiratory distress, lungs are clear to auscultation  Cardiovascular: normal heart rate and rhythm, normal S1 and S2, no murmurs and pedal pulses and 2+ bilaterally, No edema  Abdomen: abdomen is soft, non-tender with no masses  Musculoskeletal: normal gait and station and exam of the digits and nails are normal  Neurological: normal coordination and normal general cortical function      Lab Review:    Lab Results   Component Value Date    WBC 6.6 06/12/2018    HGB 14.4 06/12/2018    HCT 41.6 06/12/2018    MCV 95.2 06/12/2018     06/12/2018     Lab Results   Component Value Date     09/28/2018    K 4.4 09/28/2018     09/28/2018    CO2 28 09/28/2018    BUN 21 09/28/2018    CREATININE 0.6 09/28/2018    CREATININE 0.6 09/28/2018    GLUCOSE 93 09/28/2018    CALCIUM 9.7 09/28/2018    PROT 7.1 09/28/2018    PROT 7.2 03/08/2013    LABALBU 4.6 09/28/2018    LABALBU 3.7 09/28/2018    BILITOT <0.2 09/28/2018    ALKPHOS 95 09/28/2018    AST 24 09/28/2018    ALT 16 09/28/2018    LABGLOM >60 09/28/2018    GFRAA >60 09/28/2018    GFRAA >60 03/08/2013    AGRATIO 1.8 09/28/2018    GLOB 2.5 09/28/2018     Lab Results   Component Value Date    TSH 1.48 06/12/2018     Lab Results   Component Value Date    LABA1C 5.5 06/12/2018     Lab Results   Component Value Date    .2 06/12/2018     Lab Results   Component Value Date    CHOL 250 06/12/2018     Lab Results   Component Value Date    TRIG 113 06/12/2018     Lab Results   Component Value Date     06/12/2018     Lab Results   Component Value Date    LDLCALC 96 06/12/2018     Lab Results   Component Value Date LABVLDL 23 06/12/2018     No results found for: JOSE  No results found for: Amee Gacria  Lab Results   Component Value Date    VITD25 54.7 05/31/2017        ASSESSMENT/PLAN:  1. Age-related osteoporosis without current pathological fracture  Recommend Prolia twice a year. Calcium, vitamin D. Has worsening of bone density. Patient prefers to follow with PCP. 2. Gastroesophageal reflux disease without esophagitis  Follow. Not a candidate for oral biphosphonates. 3. Family history of hip fracture  Mother had atypical femur fracture. Reviewed and/or ordered clinical lab results Yes  Reviewed and/or ordered radiology tests Yes   Reviewed and/or ordered other diagnostic tests No  Discussed test results with performing physician No  Independently reviewed image, tracing, or specimen No  Made a decision to obtain old records No  Reviewed and summarized old records Yes  Osteopenia, now osteoporosis. Obtained history from other than patient No    Rocío Ream was counseled regarding symptoms of osteoporosis diagnosis, course and complications of disease if inadequately treated, side effects of medications, diagnosis, treatment options, and prognosis, risks, benefits, complications, and alternatives of treatment, labs, imaging and other studies and treatment targets and goals, medications, side effects. Patient refused treatment, she understand fracture risk. .  She understands instructions and counseling. Total visit time 25 min, >50% was counseling time. Return if symptoms worsen or fail to improve.

## 2018-11-12 RX ORDER — PRAVASTATIN SODIUM 40 MG
TABLET ORAL
Qty: 30 TABLET | Refills: 2 | Status: SHIPPED | OUTPATIENT
Start: 2018-11-12 | End: 2018-12-31 | Stop reason: CLARIF

## 2018-12-03 DIAGNOSIS — M47.816 SPONDYLOSIS OF LUMBAR REGION WITHOUT MYELOPATHY OR RADICULOPATHY: ICD-10-CM

## 2018-12-03 DIAGNOSIS — G89.29 CHRONIC BILATERAL LOW BACK PAIN WITHOUT SCIATICA: ICD-10-CM

## 2018-12-03 DIAGNOSIS — M54.50 CHRONIC BILATERAL LOW BACK PAIN WITHOUT SCIATICA: ICD-10-CM

## 2018-12-03 DIAGNOSIS — G25.81 RLS (RESTLESS LEGS SYNDROME): ICD-10-CM

## 2018-12-03 RX ORDER — TRAMADOL HYDROCHLORIDE 50 MG/1
TABLET ORAL
Qty: 60 TABLET | Refills: 0 | Status: SHIPPED | OUTPATIENT
Start: 2018-12-03 | End: 2019-01-13 | Stop reason: SDUPTHER

## 2018-12-31 ENCOUNTER — OFFICE VISIT (OUTPATIENT)
Dept: INTERNAL MEDICINE CLINIC | Age: 68
End: 2018-12-31
Payer: MEDICARE

## 2018-12-31 VITALS
BODY MASS INDEX: 18.78 KG/M2 | DIASTOLIC BLOOD PRESSURE: 64 MMHG | HEART RATE: 72 BPM | WEIGHT: 93 LBS | SYSTOLIC BLOOD PRESSURE: 118 MMHG

## 2018-12-31 DIAGNOSIS — M47.816 LUMBAR FACET ARTHROPATHY: ICD-10-CM

## 2018-12-31 DIAGNOSIS — M54.50 CHRONIC BILATERAL LOW BACK PAIN WITHOUT SCIATICA: ICD-10-CM

## 2018-12-31 DIAGNOSIS — R01.1 HEART MURMUR: ICD-10-CM

## 2018-12-31 DIAGNOSIS — M81.0 AGE-RELATED OSTEOPOROSIS WITHOUT CURRENT PATHOLOGICAL FRACTURE: ICD-10-CM

## 2018-12-31 DIAGNOSIS — G25.81 RLS (RESTLESS LEGS SYNDROME): Primary | ICD-10-CM

## 2018-12-31 DIAGNOSIS — G89.29 CHRONIC BILATERAL LOW BACK PAIN WITHOUT SCIATICA: ICD-10-CM

## 2018-12-31 DIAGNOSIS — E78.5 HYPERLIPIDEMIA, UNSPECIFIED HYPERLIPIDEMIA TYPE: ICD-10-CM

## 2018-12-31 PROCEDURE — 99214 OFFICE O/P EST MOD 30 MIN: CPT | Performed by: INTERNAL MEDICINE

## 2018-12-31 NOTE — PATIENT INSTRUCTIONS
Patient Education        Osteoporosis: Care Instructions  Your Care Instructions    Osteoporosis causes bones to become thin and weak. It is much more common in women than in men. Osteoporosis may be very advanced before you know you have it. Sometimes the first sign is a broken bone in the hip, spine, or wrist or sudden pain in your middle or lower back. Follow-up care is a key part of your treatment and safety. Be sure to make and go to all appointments, and call your doctor if you are having problems. It's also a good idea to know your test results and keep a list of the medicines you take. How can you care for yourself at home? · Your doctor may prescribe a bisphosphonate, such as risedronate (Actonel) or alendronate (Fosamax), for osteoporosis. If you are taking one of these medicines by mouth:  ? Take your medicine with a full glass of water when you first get up in the morning. ? Do not lie down, eat, drink a beverage, or take any other medicine for at least 30 minutes after taking the drug. This helps prevent stomach problems. ? Do not take your medicine late in the day if you forgot to take it in the morning. Skip it, and take the usual dose the next morning. ? If you have side effects, tell your doctor. He or she may prescribe another medicine. · Get enough calcium and vitamin D. The [employer] recommends adults younger than age 46 need 1,000 mg of calcium and 600 IU of vitamin D each day. Women ages 46 to 79 need 1,200 mg of calcium and 600 IU of vitamin D each day. Men ages 46 to 79 need 1,000 mg of calcium and 600 IU of vitamin D each day. Adults 71 and older need 1,200 mg of calcium and 800 IU of vitamin D each day. ? Eat foods rich in calcium, like yogurt, cheese, milk, and dark green vegetables. This is a good way to get the calcium you need. You can get vitamin D from eggs, fatty fish, cereal, and milk. ? Talk to your doctor about taking a calcium plus vitamin D supplement.  Be If you do not have an account, please click on the \"Sign Up Now\" link. Current as of: March 16, 2018  Content Version: 11.8  © 2006-2018 [employer]. Care instructions adapted under license by [facility] ([facility]). If you have questions about a medical condition or this instruction, always ask your healthcare professional. [address] any warranty or liability for your use of this information. Patient Education        Restless Legs Syndrome: Care Instructions  Your Care Instructions  Restless legs syndrome is a common nervous system problem. People with this syndrome feel a creeping, achy, or unpleasant feeling in the legs and an overpowering urge to move them. It often occurs in the evening and at night and can lead to sleep problems and tiredness. Your doctor may suggest doing a study of your sleep patterns to figure out what is happening when you try to sleep. Many people get relief from symptoms when they get regular exercise, eat well, and avoid caffeine, alcohol, and tobacco.  Follow-up care is a key part of your treatment and safety. Be sure to make and go to all appointments, and call your doctor if you are having problems. It's also a good idea to know your test results and keep a list of the medicines you take. How can you care for yourself at home? · Take your medicines exactly as prescribed. Call your doctor if you think you are having a problem with your medicine. · Try bathing in hot or cold water. Applying a heating pad or ice bag to your legs may also help symptoms. · Stretch and massage your legs before bed or when discomfort begins. · Get some exercise for at least 30 minutes a day on most days of the week. Stop exercising at least 3 hours before bedtime. · Try to plan for situations where you will need to remain seated for long stretches. For example, if you are traveling by car, plan some stops so you can get out and walk around.   · Tell your doctor

## 2018-12-31 NOTE — PROGRESS NOTES
 RLS (restless legs syndrome)     Scoliosis     Spondylolisthesis        Review of Systems - As per HPI      OBJECTIVE:  /64   Pulse 72   Wt 93 lb (42.2 kg)   BMI 18.78 kg/m²   GEN: NAD, A&O, Non-toxic  HEENT: NC/AT, SUZE, EOMI, Oral cavity Clear,  TM's NL, throat normal.  Nasal cavity is clear without purulent discharge  NECK: Supple. No thyromegaly. No JVD. LYMPH: No C/SC nodes. CV: Regular rhythm. Rate normal.  1/6 systolic murmur. PULM: CTA  EXT: No edema. GI: Abdomen is soft, NT  NEURO: No focal or lateralizing deficits. Cranial nerves II through XII are intact  VASC:  No carotid bruits. Pulses symmetric  MS:  Gait NL. No reproducible TTP. Tight Lumbar erector spinae. ASSESSMENT/PLAN:    1. RLS (restless legs syndrome)  Condition is well controlled  Continue the tramadol once nightly  OARRS is up-to-date    2. Age-related osteoporosis without current pathological fracture  Condition is uncontrolled and untreated except for calcium and vitamin D and weightbearing exercise  Patient has seen endocrinology and Prolia was recommended. Patient is contemplating. 3. Hyperlipidemia, unspecified hyperlipidemia type  Condition is fairly well controlled for her age  Continue pravastatin for cardiovascular risk reduction. Lab work next visit    4. Lumbar facet arthropathy  As below    5. Chronic bilateral low back pain without sciatica  Condition is well controlled with home back stretching exercises and occasional tramadol use  OARRS is up-to-date    6.  Heart murmur  Condition seems unchanged and mild  Consider echocardiogram next visit  Age and asymptomatic        Controlled Substances Monitoring: OARRS UTD

## 2019-01-13 DIAGNOSIS — M54.50 CHRONIC BILATERAL LOW BACK PAIN WITHOUT SCIATICA: ICD-10-CM

## 2019-01-13 DIAGNOSIS — M47.816 SPONDYLOSIS OF LUMBAR REGION WITHOUT MYELOPATHY OR RADICULOPATHY: ICD-10-CM

## 2019-01-13 DIAGNOSIS — G89.29 CHRONIC BILATERAL LOW BACK PAIN WITHOUT SCIATICA: ICD-10-CM

## 2019-01-13 DIAGNOSIS — G25.81 RLS (RESTLESS LEGS SYNDROME): ICD-10-CM

## 2019-01-14 RX ORDER — TRAMADOL HYDROCHLORIDE 50 MG/1
TABLET ORAL
Qty: 60 TABLET | Refills: 0 | Status: SHIPPED | OUTPATIENT
Start: 2019-01-14 | End: 2019-02-27 | Stop reason: SDUPTHER

## 2019-02-04 ENCOUNTER — TELEPHONE (OUTPATIENT)
Dept: INTERNAL MEDICINE CLINIC | Age: 69
End: 2019-02-04

## 2019-02-04 DIAGNOSIS — F40.243 ANXIETY WITH FLYING: ICD-10-CM

## 2019-02-04 RX ORDER — ALPRAZOLAM 0.25 MG/1
TABLET ORAL
Qty: 10 TABLET | Refills: 0 | Status: SHIPPED | OUTPATIENT
Start: 2019-02-04 | End: 2019-08-08 | Stop reason: SDUPTHER

## 2019-02-21 RX ORDER — PRAVASTATIN SODIUM 20 MG
20 TABLET ORAL NIGHTLY
Qty: 90 TABLET | Refills: 3 | Status: SHIPPED | OUTPATIENT
Start: 2019-02-21 | End: 2020-03-03

## 2019-02-27 DIAGNOSIS — M54.50 CHRONIC BILATERAL LOW BACK PAIN WITHOUT SCIATICA: ICD-10-CM

## 2019-02-27 DIAGNOSIS — G89.29 CHRONIC BILATERAL LOW BACK PAIN WITHOUT SCIATICA: ICD-10-CM

## 2019-02-27 DIAGNOSIS — M47.816 SPONDYLOSIS OF LUMBAR REGION WITHOUT MYELOPATHY OR RADICULOPATHY: ICD-10-CM

## 2019-02-27 DIAGNOSIS — G25.81 RLS (RESTLESS LEGS SYNDROME): ICD-10-CM

## 2019-02-28 RX ORDER — TRAMADOL HYDROCHLORIDE 50 MG/1
TABLET ORAL
Qty: 60 TABLET | Refills: 0 | Status: SHIPPED | OUTPATIENT
Start: 2019-02-28 | End: 2019-04-15 | Stop reason: SDUPTHER

## 2019-04-09 ENCOUNTER — TELEPHONE (OUTPATIENT)
Dept: INTERNAL MEDICINE CLINIC | Age: 69
End: 2019-04-09

## 2019-04-09 NOTE — TELEPHONE ENCOUNTER
Patient states she got her 1st shingles shot yesterday, and has a fever of 100, and experienced an irregular heart beat. She also states that last night she woke up and her heart was beating fast, she had a fever of 100, and her body was achy. She wants to know if this is normal side effects from the shingles shot, or is this something she should be concerned about. Please contact patient @ phone # provided.

## 2019-04-15 DIAGNOSIS — M54.50 CHRONIC BILATERAL LOW BACK PAIN WITHOUT SCIATICA: ICD-10-CM

## 2019-04-15 DIAGNOSIS — G25.81 RLS (RESTLESS LEGS SYNDROME): ICD-10-CM

## 2019-04-15 DIAGNOSIS — G89.29 CHRONIC BILATERAL LOW BACK PAIN WITHOUT SCIATICA: ICD-10-CM

## 2019-04-15 DIAGNOSIS — M47.816 SPONDYLOSIS OF LUMBAR REGION WITHOUT MYELOPATHY OR RADICULOPATHY: ICD-10-CM

## 2019-04-15 RX ORDER — TRAMADOL HYDROCHLORIDE 50 MG/1
TABLET ORAL
Qty: 60 TABLET | Refills: 0 | Status: SHIPPED | OUTPATIENT
Start: 2019-04-15 | End: 2019-05-24 | Stop reason: SDUPTHER

## 2019-05-24 DIAGNOSIS — G89.29 CHRONIC BILATERAL LOW BACK PAIN WITHOUT SCIATICA: ICD-10-CM

## 2019-05-24 DIAGNOSIS — G25.81 RLS (RESTLESS LEGS SYNDROME): ICD-10-CM

## 2019-05-24 DIAGNOSIS — M54.50 CHRONIC BILATERAL LOW BACK PAIN WITHOUT SCIATICA: ICD-10-CM

## 2019-05-24 DIAGNOSIS — M47.816 SPONDYLOSIS OF LUMBAR REGION WITHOUT MYELOPATHY OR RADICULOPATHY: ICD-10-CM

## 2019-05-24 RX ORDER — TRAMADOL HYDROCHLORIDE 50 MG/1
TABLET ORAL
Qty: 60 TABLET | Refills: 0 | Status: SHIPPED | OUTPATIENT
Start: 2019-05-24 | End: 2019-07-08 | Stop reason: SDUPTHER

## 2019-07-08 ENCOUNTER — PATIENT MESSAGE (OUTPATIENT)
Dept: INTERNAL MEDICINE CLINIC | Age: 69
End: 2019-07-08

## 2019-07-08 DIAGNOSIS — M54.50 CHRONIC BILATERAL LOW BACK PAIN WITHOUT SCIATICA: ICD-10-CM

## 2019-07-08 DIAGNOSIS — G89.29 CHRONIC BILATERAL LOW BACK PAIN WITHOUT SCIATICA: ICD-10-CM

## 2019-07-08 DIAGNOSIS — R73.01 IMPAIRED FASTING GLUCOSE: ICD-10-CM

## 2019-07-08 DIAGNOSIS — G25.81 RLS (RESTLESS LEGS SYNDROME): ICD-10-CM

## 2019-07-08 DIAGNOSIS — E78.5 HYPERLIPIDEMIA, UNSPECIFIED HYPERLIPIDEMIA TYPE: Primary | ICD-10-CM

## 2019-07-08 DIAGNOSIS — Z13.1 SCREENING FOR DIABETES MELLITUS: ICD-10-CM

## 2019-07-08 DIAGNOSIS — M47.816 SPONDYLOSIS OF LUMBAR REGION WITHOUT MYELOPATHY OR RADICULOPATHY: ICD-10-CM

## 2019-07-08 RX ORDER — TRAMADOL HYDROCHLORIDE 50 MG/1
TABLET ORAL
Qty: 60 TABLET | Refills: 0 | Status: SHIPPED | OUTPATIENT
Start: 2019-07-08 | End: 2019-08-24 | Stop reason: SDUPTHER

## 2019-07-10 ENCOUNTER — OFFICE VISIT (OUTPATIENT)
Dept: INTERNAL MEDICINE CLINIC | Age: 69
End: 2019-07-10
Payer: MEDICARE

## 2019-07-10 VITALS
BODY MASS INDEX: 18.71 KG/M2 | SYSTOLIC BLOOD PRESSURE: 102 MMHG | OXYGEN SATURATION: 99 % | HEART RATE: 67 BPM | WEIGHT: 92.8 LBS | DIASTOLIC BLOOD PRESSURE: 64 MMHG | RESPIRATION RATE: 14 BRPM | HEIGHT: 59 IN

## 2019-07-10 DIAGNOSIS — Z13.1 SCREENING FOR DIABETES MELLITUS: ICD-10-CM

## 2019-07-10 DIAGNOSIS — F41.0 ANXIETY ATTACK: ICD-10-CM

## 2019-07-10 DIAGNOSIS — R73.01 IMPAIRED FASTING GLUCOSE: ICD-10-CM

## 2019-07-10 DIAGNOSIS — G25.81 RLS (RESTLESS LEGS SYNDROME): ICD-10-CM

## 2019-07-10 DIAGNOSIS — M54.50 CHRONIC BILATERAL LOW BACK PAIN WITHOUT SCIATICA: ICD-10-CM

## 2019-07-10 DIAGNOSIS — Z82.49 FAMILY HISTORY OF CORONARY ARTERY DISEASE: ICD-10-CM

## 2019-07-10 DIAGNOSIS — E78.5 HYPERLIPIDEMIA, UNSPECIFIED HYPERLIPIDEMIA TYPE: ICD-10-CM

## 2019-07-10 DIAGNOSIS — F41.8 SITUATIONAL ANXIETY: ICD-10-CM

## 2019-07-10 DIAGNOSIS — Z00.00 ANNUAL PHYSICAL EXAM: Primary | ICD-10-CM

## 2019-07-10 DIAGNOSIS — G89.29 CHRONIC BILATERAL LOW BACK PAIN WITHOUT SCIATICA: ICD-10-CM

## 2019-07-10 LAB
BILIRUBIN, POC: NORMAL
BLOOD URINE, POC: NORMAL
CLARITY, POC: CLEAR
COLOR, POC: YELLOW
GLUCOSE URINE, POC: NORMAL
KETONES, POC: NORMAL
LEUKOCYTE EST, POC: NORMAL
NITRITE, POC: NORMAL
PH, POC: 6
PROTEIN, POC: NORMAL
SPECIFIC GRAVITY, POC: 1
UROBILINOGEN, POC: 0.2

## 2019-07-10 PROCEDURE — 99397 PER PM REEVAL EST PAT 65+ YR: CPT | Performed by: INTERNAL MEDICINE

## 2019-07-10 PROCEDURE — 93000 ELECTROCARDIOGRAM COMPLETE: CPT | Performed by: INTERNAL MEDICINE

## 2019-07-10 PROCEDURE — 81002 URINALYSIS NONAUTO W/O SCOPE: CPT | Performed by: INTERNAL MEDICINE

## 2019-07-10 RX ORDER — ASPIRIN 81 MG/1
81 TABLET ORAL DAILY
Qty: 30 TABLET | Refills: 5 | COMMUNITY
Start: 2019-07-10

## 2019-07-10 ASSESSMENT — PATIENT HEALTH QUESTIONNAIRE - PHQ9
SUM OF ALL RESPONSES TO PHQ QUESTIONS 1-9: 0
SUM OF ALL RESPONSES TO PHQ QUESTIONS 1-9: 0
SUM OF ALL RESPONSES TO PHQ9 QUESTIONS 1 & 2: 0
1. LITTLE INTEREST OR PLEASURE IN DOING THINGS: 0
2. FEELING DOWN, DEPRESSED OR HOPELESS: 0

## 2019-07-10 NOTE — PROGRESS NOTES
anxiety, personality changes, nervousness, drug or alcohol use/abuse, H/O psych counseling: No, Psychotropics: Yes. Pt reports some panic attacks recently after she returned from her mother's  this past April. She was going through her things. She reports heart racing at night when in bed, felt suffocated, nervousness, jittery, mind racing. She is doing much better. She tried a left over Xanax which helped. SKIN :  Neg  Rash, nail changes, sun burns, tattoos, change in moles, or skin color changes  ENDOCRINE:  Neg  Polydipsia,polyuria,abnormal weight changes,heat /cold intolerance, Hair changes. No H/O Diabetes or Thyroid disease.  + H/O IFG. She has osteoporosis. She does not want to start Fosamax. Preventive Care:    Health Maintenance   Topic Date Due    Shingles Vaccine (3 of 3) 2019    A1C test (Diabetic or Prediabetic)  2019    Annual Wellness Visit (AWV)  07/10/2021 (Originally 2019)    Flu vaccine (1) 2019    Breast cancer screen  2020    DTaP/Tdap/Td vaccine (2 - Td) 03/15/2023    Lipid screen  2023    Colon cancer screen colonoscopy  2026    DEXA (modify frequency per FRAX score)  Completed    Pneumococcal 65+ years Vaccine  Completed    Hepatitis C screen  Completed      Hx abnormal PAP: yes - Years past.  ROMERO. Sexual activity: single partner, contraception - status post hysterectomy   Self-breast exams: yes  Last eye exam: , normal.  Appointment in 2 weeks. Exercise: walks 6 time(s) per week, Pilate's 3-4 times per week, lifting light weights 2-3 times per week. Seatbelt use:  Y  Sun Screen: Regularly  Dentist: Every 6 months      Physical Exam    /64   Pulse 67   Resp 14   Ht 4' 11\" (1.499 m)   Wt 92 lb 12.8 oz (42.1 kg)   SpO2 99%   BMI 18.74 kg/m²     Wt Readings from Last 3 Encounters:   07/10/19 92 lb 12.8 oz (42.1 kg)   18 93 lb (42.2 kg)   10/29/18 92 lb 6.4 oz (41.9 kg)       GEN:  67 anxiety    Anxiety attack    Family history of coronary artery disease  -     aspirin EC 81 MG EC tablet; Take 1 tablet by mouth daily             Preventive plan of care for Kelechi Gan        7/10/2019           Preventive Measures Status       Recommendations for screening   Colon Cancer Screen   Last colonoscopy: 6/29/2016 Test is due every 10 years. Next colonoscopy due 2026   Breast Cancer Screen  Last mammogram: 1/9/2019 Test is due yearly. Cervical Cancer Screen   Last PAP smear: 1998 Repeat screening is not clinically indicated due to hysterectomy status. Osteoporosis Screen   Last DXA scan: 6/29/2018 Repeat every 2 years--Next DEXA scan due June 2020. Diabetes Screen  Glucose (mg/dL)   Date Value   09/28/2018 93    Test recommended and ordered   Cholesterol Screen  Lab Results   Component Value Date    CHOL 250 (H) 06/12/2018    TRIG 113 06/12/2018     (H) 06/12/2018    LDLCALC 96 06/12/2018    Test recommended and ordered   Hepatitis C screening recommended for those born between 1945 and 1965--4/20/2016   Recommended no need to repeat at this time   HIV screening recommended for those ages 12-76  [facility] HIV--not on file  not clinically indicated at this time   Aspirin for Cardiovascular Prevention   No Start baby aspirin, 81 mg daily for cardiovascular disease prevention. Weight: Body mass index is 18.74 kg/m².   4' 11\" (1.499 m)92 lb 12.8 oz (42.1 kg)    Your BMI is between 18.5 and 24.9, which indicates that you are at a healthy weight    Recommended Immunizations    Immunization History   Administered Date(s) Administered    Influenza Virus Vaccine 02/13/2014, 10/14/2015, 10/29/2018    Influenza, High Dose (Fluzone 65 yrs and older) 12/12/2017, 10/29/2018    Influenza, Eric Everson, 3 yrs and older, IM, PF (Fluzone 3 yrs and older or Afluria 5 yrs and older) 11/28/2016, 10/29/2018    Pneumococcal Conjugate 13-valent ([id number]) 05/31/2017    Pneumococcal Polysaccharide ([id number]) 06/12/2018    Tdap (Boostrix, Adacel) 03/15/2013    Zoster Live (Zostavax) 11/13/2014    Zoster Recombinant (Shingrix) 04/08/2019        Influenza vaccine:  recommended every fall    Pneumonia vaccine: Series completed    Tetanus vaccine:  tetanus and diptheria/Pertussis vaccine (Td/Tdap) recommended every 10 years- due 2023     Shingles vaccine:  Shingrx-- Second injection due. Additional Recommendations   1. Use Sunscreen daily when exposed to the sun to reduce the risk of skin cancer. 2. Continue a healthy lifestyle including a healthy diet and aerobic exercise  3. Update your eye exam every 2 years  4. Always wear a seatbelt  5. Always wear a helmet when riding a bike or motorcycle    Here are a few  Reliable websites with a variety of health and wellness information:   www.mylifecheck. heart. org     www.nutritionsource. org     www. americanheart. org     www. diabetes. org      www.menopause. org     www.[facility]     www.[address] [place] [address])      ,The ASCVD Risk score (Marci Goldberg et al., 2013) failed to calculate for the following reasons:     The valid HDL cholesterol range is 20 to 100 mg/dL

## 2019-07-10 NOTE — PATIENT INSTRUCTIONS
Preventive plan of care for Ken Carroll        7/10/2019           Preventive Measures Status       Recommendations for screening   Colon Cancer Screen   Last colonoscopy: 6/29/2016 Test is due every 10 years. Next colonoscopy due 2026   Breast Cancer Screen  Last mammogram: 1/9/2019 Test is due yearly. Cervical Cancer Screen   Last PAP smear: 1998 Repeat screening is not clinically indicated due to hysterectomy status. Osteoporosis Screen   Last DXA scan: 6/29/2018 Repeat every 2 years--Next DEXA scan due June 2020. Diabetes Screen  Glucose (mg/dL)   Date Value   09/28/2018 93    Test recommended and ordered   Cholesterol Screen  Lab Results   Component Value Date    CHOL 250 (H) 06/12/2018    TRIG 113 06/12/2018     (H) 06/12/2018    LDLCALC 96 06/12/2018    Test recommended and ordered   Hepatitis C screening recommended for those born between 1945 and 1965--4/20/2016   Recommended no need to repeat at this time   HIV screening recommended for those ages 12-76  [facility] HIV--not on file  not clinically indicated at this time   Aspirin for Cardiovascular Prevention   No Start baby aspirin, 81 mg daily for cardiovascular disease prevention. Weight: Body mass index is 18.74 kg/m².   4' 11\" (1.499 m)92 lb 12.8 oz (42.1 kg)    Your BMI is between 18.5 and 24.9, which indicates that you are at a healthy weight    Recommended Immunizations    Immunization History   Administered Date(s) Administered    Influenza Virus Vaccine 02/13/2014, 10/14/2015, 10/29/2018    Influenza, High Dose (Fluzone 65 yrs and older) 12/12/2017, 10/29/2018    Influenza, Fidelia Simper, 3 yrs and older, IM, PF (Fluzone 3 yrs and older or Afluria 5 yrs and older) 11/28/2016, 10/29/2018    Pneumococcal Conjugate 13-valent ([id number]) 05/31/2017    Pneumococcal Polysaccharide ([id number]) 06/12/2018    Tdap (Boostrix, Adacel) 03/15/2013    Zoster Live (Zostavax) 11/13/2014    Zoster Recombinant (Shingrix) in to your [employer] ([employer]) account. Enter S979 in the [facility] box to learn more about \"Taking Aspirin and Other Antiplatelets Safely: Care Instructions. \"     If you do not have an account, please click on the \"Sign Up Now\" link. Current as of: July 22, 2018  Content Version: 12.0  © 5707-4560 [employer]. Care instructions adapted under license by [facility] ([facility]). If you have questions about a medical condition or this instruction, always ask your healthcare professional. [address] any warranty or liability for your use of this information. Patient Education        Back Care and Preventing Injuries: Care Instructions  Your Care Instructions    You can hurt your back doing many everyday activities: lifting a heavy box, bending down to garden, exercising at the gym, and even getting out of bed. But you can keep your back strong and healthy by doing some exercises. You also can follow a few tips for sitting, sleeping, and lifting to avoid hurting your back again. Talk to your doctor before you start an exercise program. Ask for help if you want to learn more about keeping your back healthy. Follow-up care is a key part of your treatment and safety. Be sure to make and go to all appointments, and call your doctor if you are having problems. It's also a good idea to know your test results and keep a list of the medicines you take. How can you care for yourself at home? · Stay at a healthy weight to avoid strain on your lower back. · Do not smoke. Smoking increases the risk of osteoporosis, which weakens the spine. If you need help quitting, talk to your doctor about stop-smoking programs and medicines. These can increase your chances of quitting for good. · Make sure you sleep in a position that maintains your back's normal curves and on a mattress that feels comfortable.  Sleep on your side with a pillow between your knees, or sleep on your back with a pillow hike. Plan your day. Having too much or too little to do can make you anxious. · Keep a record of your symptoms. Discuss your fears with a good friend or family member, or join a support group for people with similar problems. Talking to others sometimes relieves stress. · Get involved in social groups, or volunteer to help others. Being alone sometimes makes things seem worse than they are. · Get at least 30 minutes of exercise on most days of the week to relieve stress. Walking is a good choice. You also may want to do other activities, such as running, swimming, cycling, or playing tennis or team sports. Relaxation techniques  Do relaxation exercises for 10 to 20 minutes a day. You can play soothing, relaxing music while you do them, if you wish. · Tell others in your house that you are going to do your relaxation exercises. Ask them not to disturb you. · Find a comfortable place, away from all distractions and noise. · Lie down on your back, or sit with your back straight. · Focus on your breathing. Make it slow and steady. · Breathe in through your nose. Breathe out through either your nose or mouth. · Breathe deeply, filling up the area between your navel and your rib cage. Breathe so that your belly goes up and down. · Do not hold your breath. · Breathe like this for 5 to 10 minutes. Notice the feeling of calmness throughout your whole body. As you continue to breathe slowly and deeply, relax by doing the following for another 5 to 10 minutes:  · Tighten and relax each muscle group in your body. You can begin at your toes and work your way up to your head. · Imagine your muscle groups relaxing and becoming heavy. · Empty your mind of all thoughts. · Let yourself relax more and more deeply. · Become aware of the state of calmness that surrounds you.   · When your relaxation time is over, you can bring yourself back to alertness by moving your fingers and toes and then your hands and feet and

## 2019-08-07 DIAGNOSIS — F40.243 ANXIETY WITH FLYING: ICD-10-CM

## 2019-08-08 RX ORDER — ALPRAZOLAM 0.25 MG/1
TABLET ORAL
Qty: 10 TABLET | Refills: 0 | Status: SHIPPED | OUTPATIENT
Start: 2019-08-08 | End: 2020-03-04 | Stop reason: SDUPTHER

## 2019-08-24 DIAGNOSIS — G89.29 CHRONIC BILATERAL LOW BACK PAIN WITHOUT SCIATICA: ICD-10-CM

## 2019-08-24 DIAGNOSIS — M54.50 CHRONIC BILATERAL LOW BACK PAIN WITHOUT SCIATICA: ICD-10-CM

## 2019-08-24 DIAGNOSIS — M47.816 SPONDYLOSIS OF LUMBAR REGION WITHOUT MYELOPATHY OR RADICULOPATHY: ICD-10-CM

## 2019-08-24 DIAGNOSIS — G25.81 RLS (RESTLESS LEGS SYNDROME): ICD-10-CM

## 2019-08-26 RX ORDER — TRAMADOL HYDROCHLORIDE 50 MG/1
TABLET ORAL
Qty: 60 TABLET | Refills: 0 | Status: SHIPPED | OUTPATIENT
Start: 2019-08-26 | End: 2019-10-14 | Stop reason: SDUPTHER

## 2019-10-14 DIAGNOSIS — G89.29 CHRONIC BILATERAL LOW BACK PAIN WITHOUT SCIATICA: ICD-10-CM

## 2019-10-14 DIAGNOSIS — G25.81 RLS (RESTLESS LEGS SYNDROME): ICD-10-CM

## 2019-10-14 DIAGNOSIS — M47.816 SPONDYLOSIS OF LUMBAR REGION WITHOUT MYELOPATHY OR RADICULOPATHY: ICD-10-CM

## 2019-10-14 DIAGNOSIS — M54.50 CHRONIC BILATERAL LOW BACK PAIN WITHOUT SCIATICA: ICD-10-CM

## 2019-10-15 RX ORDER — TRAMADOL HYDROCHLORIDE 50 MG/1
TABLET ORAL
Qty: 60 TABLET | Refills: 0 | Status: SHIPPED | OUTPATIENT
Start: 2019-10-15 | End: 2019-12-10 | Stop reason: SDUPTHER

## 2019-10-16 ENCOUNTER — OFFICE VISIT (OUTPATIENT)
Dept: INTERNAL MEDICINE CLINIC | Age: 69
End: 2019-10-16
Payer: MEDICARE

## 2019-10-16 VITALS — TEMPERATURE: 98.9 F | SYSTOLIC BLOOD PRESSURE: 120 MMHG | HEART RATE: 76 BPM | DIASTOLIC BLOOD PRESSURE: 60 MMHG

## 2019-10-16 DIAGNOSIS — H93.13 TINNITUS, BILATERAL: ICD-10-CM

## 2019-10-16 DIAGNOSIS — H91.93 HEARING PROBLEM OF BOTH EARS: Primary | ICD-10-CM

## 2019-10-16 DIAGNOSIS — H69.83 EUSTACHIAN TUBE DYSFUNCTION, BILATERAL: ICD-10-CM

## 2019-10-16 PROCEDURE — 99213 OFFICE O/P EST LOW 20 MIN: CPT | Performed by: INTERNAL MEDICINE

## 2019-10-16 RX ORDER — METHYLPREDNISOLONE 4 MG/1
4 TABLET ORAL SEE ADMIN INSTRUCTIONS
Qty: 1 KIT | Refills: 1 | Status: SHIPPED | OUTPATIENT
Start: 2019-10-16 | End: 2019-10-22

## 2019-12-07 DIAGNOSIS — M47.816 SPONDYLOSIS OF LUMBAR REGION WITHOUT MYELOPATHY OR RADICULOPATHY: ICD-10-CM

## 2019-12-07 DIAGNOSIS — G89.29 CHRONIC BILATERAL LOW BACK PAIN WITHOUT SCIATICA: ICD-10-CM

## 2019-12-07 DIAGNOSIS — G25.81 RLS (RESTLESS LEGS SYNDROME): ICD-10-CM

## 2019-12-07 DIAGNOSIS — M54.50 CHRONIC BILATERAL LOW BACK PAIN WITHOUT SCIATICA: ICD-10-CM

## 2019-12-10 DIAGNOSIS — M54.50 CHRONIC BILATERAL LOW BACK PAIN WITHOUT SCIATICA: ICD-10-CM

## 2019-12-10 DIAGNOSIS — G89.29 CHRONIC BILATERAL LOW BACK PAIN WITHOUT SCIATICA: ICD-10-CM

## 2019-12-10 DIAGNOSIS — M47.816 SPONDYLOSIS OF LUMBAR REGION WITHOUT MYELOPATHY OR RADICULOPATHY: ICD-10-CM

## 2019-12-10 DIAGNOSIS — G25.81 RLS (RESTLESS LEGS SYNDROME): ICD-10-CM

## 2019-12-10 RX ORDER — TRAMADOL HYDROCHLORIDE 50 MG/1
50 TABLET ORAL EVERY 8 HOURS PRN
Qty: 60 TABLET | Refills: 0 | Status: SHIPPED | OUTPATIENT
Start: 2019-12-10 | End: 2020-01-13 | Stop reason: DRUGHIGH

## 2019-12-10 RX ORDER — TRAMADOL HYDROCHLORIDE 50 MG/1
TABLET ORAL
Qty: 60 TABLET | Refills: 0 | Status: CANCELLED | OUTPATIENT
Start: 2019-12-10 | End: 2020-02-08

## 2019-12-11 RX ORDER — TRAMADOL HYDROCHLORIDE 50 MG/1
TABLET ORAL
Qty: 60 TABLET | Refills: 0 | OUTPATIENT
Start: 2019-12-11 | End: 2020-02-09

## 2020-01-13 ENCOUNTER — OFFICE VISIT (OUTPATIENT)
Dept: INTERNAL MEDICINE CLINIC | Age: 70
End: 2020-01-13
Payer: MEDICARE

## 2020-01-13 VITALS
RESPIRATION RATE: 12 BRPM | DIASTOLIC BLOOD PRESSURE: 50 MMHG | BODY MASS INDEX: 18.75 KG/M2 | HEART RATE: 78 BPM | WEIGHT: 93 LBS | HEIGHT: 59 IN | SYSTOLIC BLOOD PRESSURE: 115 MMHG

## 2020-01-13 PROCEDURE — 99214 OFFICE O/P EST MOD 30 MIN: CPT | Performed by: INTERNAL MEDICINE

## 2020-01-13 RX ORDER — TRAMADOL HYDROCHLORIDE 50 MG/1
50 TABLET ORAL NIGHTLY
Qty: 60 TABLET | Refills: 0 | Status: SHIPPED | OUTPATIENT
Start: 2020-01-13 | End: 2020-02-07

## 2020-01-13 RX ORDER — TRAMADOL HYDROCHLORIDE 50 MG/1
50 TABLET ORAL NIGHTLY
Qty: 45 TABLET | Refills: 0 | Status: SHIPPED | OUTPATIENT
Start: 2020-01-13 | End: 2020-01-13 | Stop reason: DRUGHIGH

## 2020-01-13 ASSESSMENT — PATIENT HEALTH QUESTIONNAIRE - PHQ9
SUM OF ALL RESPONSES TO PHQ QUESTIONS 1-9: 0
1. LITTLE INTEREST OR PLEASURE IN DOING THINGS: 0
SUM OF ALL RESPONSES TO PHQ9 QUESTIONS 1 & 2: 0
SUM OF ALL RESPONSES TO PHQ QUESTIONS 1-9: 0
2. FEELING DOWN, DEPRESSED OR HOPELESS: 0

## 2020-01-13 NOTE — PATIENT INSTRUCTIONS
your chair or desk is too high, use a foot rest to raise your knees. · When driving, keep your knees nearly level with your hips. Sit straight, and drive with both hands on the steering wheel. Your arms should be in a slightly bent position. · Try a kneeling chair, which helps tilt your hips forward. This takes pressure off your lower back. · Try sitting on an exercise ball. It can rock from side to side, which helps keep your back loose. Lift properly  · Squat down, bending at the hips and knees only. If you need to, put one knee to the floor and extend your other knee in front of you, bent at a right angle (half kneeling). · Press your chest straight forward. This helps keep your upper back straight while keeping a slight arch in your low back. · Hold the load as close to your body as possible, at the level of your navel. · Use your feet to change direction, taking small steps. · Lead with your hips as you change direction. Keep your shoulders in line with your hips as you move. Do not twist your body. · Set down your load carefully, squatting with your knees and hips only. When should you call for help? Watch closely for changes in your health, and be sure to contact your doctor if you have any problems. Where can you learn more? Go to https://PocketGuide.Triblio. org and sign in to your [employer] account. Enter S810 in the [employer] box to learn more about \"Back Care and Preventing Injuries: Care Instructions. \"     If you do not have an account, please click on the \"Sign Up Now\" link. Current as of: June 26, 2019  Content Version: 12.3  © 9108-0648 [employer]. Care instructions adapted under license by [facility] ([facility]). If you have questions about a medical condition or this instruction, always ask your healthcare professional. [address] any warranty or liability for your use of this information.

## 2020-02-07 RX ORDER — TRAMADOL HYDROCHLORIDE 50 MG/1
50 TABLET ORAL NIGHTLY
Qty: 60 TABLET | Refills: 0 | Status: SHIPPED | OUTPATIENT
Start: 2020-02-07 | End: 2020-04-01

## 2020-03-03 RX ORDER — PRAVASTATIN SODIUM 20 MG
TABLET ORAL
Qty: 90 TABLET | Refills: 2 | Status: SHIPPED | OUTPATIENT
Start: 2020-03-03 | End: 2020-12-08

## 2020-03-04 RX ORDER — ALPRAZOLAM 0.25 MG/1
TABLET ORAL
Qty: 10 TABLET | Refills: 0 | Status: SHIPPED | OUTPATIENT
Start: 2020-03-04 | End: 2021-06-01 | Stop reason: SDUPTHER

## 2020-04-01 RX ORDER — TRAMADOL HYDROCHLORIDE 50 MG/1
TABLET ORAL
Qty: 60 TABLET | Refills: 0 | Status: SHIPPED | OUTPATIENT
Start: 2020-04-01 | End: 2020-06-01

## 2020-06-01 RX ORDER — TRAMADOL HYDROCHLORIDE 50 MG/1
TABLET ORAL
Qty: 60 TABLET | Refills: 0 | Status: SHIPPED | OUTPATIENT
Start: 2020-06-01 | End: 2020-07-01

## 2020-07-21 ENCOUNTER — OFFICE VISIT (OUTPATIENT)
Dept: INTERNAL MEDICINE CLINIC | Age: 70
End: 2020-07-21
Payer: MEDICARE

## 2020-07-21 VITALS
WEIGHT: 92 LBS | BODY MASS INDEX: 18.06 KG/M2 | HEART RATE: 72 BPM | OXYGEN SATURATION: 98 % | SYSTOLIC BLOOD PRESSURE: 120 MMHG | TEMPERATURE: 97.7 F | HEIGHT: 60 IN | RESPIRATION RATE: 16 BRPM | DIASTOLIC BLOOD PRESSURE: 60 MMHG

## 2020-07-21 PROBLEM — I34.1 MVP (MITRAL VALVE PROLAPSE): Status: ACTIVE | Noted: 2020-07-21

## 2020-07-21 PROBLEM — R01.1 HEART MURMUR: Status: ACTIVE | Noted: 2020-07-21

## 2020-07-21 PROCEDURE — 99397 PER PM REEVAL EST PAT 65+ YR: CPT | Performed by: INTERNAL MEDICINE

## 2020-07-21 PROCEDURE — 3288F FALL RISK ASSESSMENT DOCD: CPT | Performed by: INTERNAL MEDICINE

## 2020-07-21 PROCEDURE — 93000 ELECTROCARDIOGRAM COMPLETE: CPT | Performed by: INTERNAL MEDICINE

## 2020-07-21 RX ORDER — TRAMADOL HYDROCHLORIDE 50 MG/1
50 TABLET ORAL NIGHTLY
COMMUNITY
End: 2021-01-20 | Stop reason: SDUPTHER

## 2020-07-21 NOTE — PROGRESS NOTES
[facility]) Physicians  Internal Medicine  Patient Encounter  Andrew Goldsmith D.O., [facility] Preventative Physical    Chief Complaint   Patient presents with    Annual Exam     \"allergies in full bloom\"       HPI-- 78 y.o. female presents today for a complete annual physical.  Patient continues to request a regular physical rather than a Medicare annual wellness visit. She offers no concerns. She has been feeling well. Medical/Surgical Histories     Past Medical History:   Diagnosis Date    Arthropathy of cervical facet joint     Cervical spondylosis     Chronic bilateral low back pain without sciatica 11/28/2016    DJD (degenerative joint disease), cervical 10/13/2010    DJD (degenerative joint disease), lumbar 9/10/2010    Heart murmur 7/21/2020    Hyperlipidemia     Impaired fasting glucose 6/12/2018    Lumbar facet arthropathy     Mild mitral valve prolapse     MVP (mitral valve prolapse) 7/21/2020    Osteopenia     PONV (postoperative nausea and vomiting)     RLS (restless legs syndrome)     Scoliosis     Spondylolisthesis           Past Surgical History:   Procedure Laterality Date    CYSTOSCOPY      HYSTERECTOMY, TOTAL ABDOMINAL  1998    ROTATOR CUFF REPAIR Right 2008           Medications/Allergies     Current Outpatient Medications   Medication Sig Dispense Refill    traMADol (ULTRAM) 50 MG tablet Take 50 mg by mouth nightly.  pravastatin (PRAVACHOL) 20 MG tablet TAKE ONE TABLET BY MOUTH ONCE NIGHTLY 90 tablet 2    aspirin EC 81 MG EC tablet Take 1 tablet by mouth daily 30 tablet 5    CALCIUM CARBONATE-VITAMIN D PO Take 1 tablet by mouth 2 times daily       No current facility-administered medications for this visit. No Known Allergies      Substance Use History     Social History     Tobacco Use    Smoking status: Never Smoker    Smokeless tobacco: Never Used   Substance Use Topics    Alcohol use:  Yes     Alcohol/week: 2.0 standard drinks     Types: 2 Glasses of wine per week    Drug use: No          Family History     Family History   Problem Relation Age of Onset    Diabetes Mother     Heart Disease Mother 67        CAD, CABG    Heart Disease Father 37        CAD,CABG, Stents x 2    Diabetes Brother     Heart Disease Brother 76        CAD, Arrhythmia    Diabetes Maternal Grandmother               REVIEW OF SYSTEMS:    CONSTITUTIONAL:  Neg   Recent weight changes, fatigue, fever, chills or night sweats, anorexia, Sleep difficulties  EYES: Neg  Blurry vision, loss of vision, double vision, tearing, itching, eye pain. EARS:  Neg Hearing loss, tinnitus, vertigo, discharge or otalgia. NOSE:  Neg  Rhinorrhea, sneezing, itching, epistaxis, or snoring.  + Seasonal allergy symptoms-- runny nose, sneezing. MOUTH/THROAT:  Neg Bleeding gums, hoarseness, sore throat, dysphagia, throat infections, or dentures  RESPIRATORY:  Neg SOB ,wheeze, cough, sputum, hemoptysis. No report of + TB test.    CARDIOVASCULAR:  Neg Chest pain, palpitations, dyspnea on exertion, orthopnea, paroxysmal nocturnal dyspnea or edema of extremities, or claudication. + MVP, + Murmur. GASTROINTESTINAL:  Neg   Nausea, vomiting, or diarrhea, constipation, hematemesis, heart burn, dysphagia,change in bowel movements or stool caliber, hematochezia, melena, abdominal pain, or food intolerance. Colonoscopy: Yes, 2016, 10 year recall. GENITOURINARY:  Neg  Urinary frequency, hesitancy, urgency, polyuria, dysuria, hematuria, nocturia, incontinence, change in stream, genital pain or swelling, kidney stones, STD's. PAP/SHAHID: Yes, no longer requires. HEMATOLOGIC/LYMPHATIC:  Neg  Anemia, bleeding dyscrasias, easy bruising, blood clots (DVT/PE), transfusions, or enlarged lymph nodes  MUSCULOSKELETAL:  Neg  New myalgias, bone pain, joint pain, joint swelling, radicular pain, or fractures. + Chronic low back pain-- Much improved with yoga. Neck pain is under good control.   She may use an extra Tramadol. NEUROLOGICAL:  Neg  Loss of Consciousness, memeory loss or forgetfulness, confusion, difficulty concentrating, seizures, insomina, aphasia or dysarthria unilateral weakness or paresthesias, ataxia, headaches, syncope, tremor, or H/O head trauma.  + RLS-- Treated with Tramadol initially years ago. This has been effective. Without the Tramadol, she will wake with jumping and fidgety legs. PSYCHIATRIC:  Neg  Depression, anxiety, personality changes, nervousness, drug or alcohol use/abuse, H/O psych counseling: No, Psychotropics: Yes. SKIN :  Neg  Rash, nail changes, sun burns, tattoos, change in moles, or skin color changes  ENDOCRINE:  Neg  Polydipsia,polyuria,abnormal weight changes,heat /cold intolerance, Hair changes. No H/O Diabetes or Thyroid disease.  + H/O IFG. She has osteoporosis. She has declined therapy. She denies fractures. Preventive Care:    Health Maintenance   Topic Date Due    A1C test (Diabetic or Prediabetic)  07/10/2020    Lipid screen  07/10/2020    Annual Wellness Visit (AWV)  07/10/2021 (Originally 6/20/2019)    Flu vaccine (1) 09/01/2020    Breast cancer screen  07/13/2021    DTaP/Tdap/Td vaccine (2 - Td) 03/15/2023    Colon cancer screen colonoscopy  06/29/2026    DEXA (modify frequency per FRAX score)  Completed    Shingles Vaccine  Completed    Pneumococcal 65+ years Vaccine  Completed    Hepatitis C screen  Completed    Hepatitis A vaccine  Aged Out    Hepatitis B vaccine  Aged Out    Hib vaccine  Aged Out    Meningococcal (ACWY) vaccine  Aged Out      Hx abnormal PAP: yes - Years past.  ROMERO.     Sexual activity: single partner, contraception - status post hysterectomy   Self-breast exams: yes  Last eye exam: 2019, normal. Yearly   Exercise: walks 6 time(s) per week 2 miles, yoga, Pilate's, Pickle Ball  Seatbelt use: Yes  Sun Screen: Regularly  Dentist: Every 6 months, UTD      Physical Exam    /60   Pulse 72   Temp 97.7 °F (36.5 °C) (Temporal)   Resp 16   Ht 5' (1.524 m)   Wt 92 lb (41.7 kg)   SpO2 98% Comment: Room Air  BMI 17.97 kg/m²     Wt Readings from Last 3 Encounters:   07/21/20 92 lb (41.7 kg)   01/13/20 93 lb (42.2 kg)   07/10/19 92 lb 12.8 oz (42.1 kg)       GEN:  78 y.o. female who is in NAD, A&O. She appears stated age and well nourished. She is cooperative and pleasant. HEAD:  NC/AT, no lesions. EYES:  REAL, EOMI, No scleral icterus or conjunctival injection or discharge. Funduscopic exam not performed due to constricting pupils  EARS:  EAC's clear, TM's normal.  NOSE:  Nasal cavity is clear. No mucosal congestion or discharge. Sinuses are nontender. MOUTH & THROAT:  Oral cavity is clear without mucosal lesions. Tongue is midline. Dentition is in good repair. No pharyngeal erythema or exudate. NECK:  Supple. Full ROM. Trachea is midline. No increased JVD. No thyromegaly or nodules. No masses  LYMPH: No C/SC/A/F nodes  CARDIAC:  S1S2 NL. Regular rhythm. No ectopy. 2-2/9 systolic murmur heard best at the apex. Murmur accentuated with standing, and Valsalva (decreased venous return)  VASC:  Pedal pulses 2/4. Carotid upstrokes 2+. No bruits noted. PULM:  Lungs are CTA. Symmetric breath sounds noted. AP Diameter NL. GI:  Abdomen is soft and nontender. No distension. No organomegaly. No masses. No pulsatile masses. EXT:  No Cyanosis or clubbing. No edema. SKIN: Warm and dry, normal turgor, no rash or lesions of concern. NEURO:  Cranial nerves 2-12 are NL. Speech fluent and coherent. Strength is 5/5 in all muscle groups. No sensory deficits. No focal or lateralizing deficits. Reflexes 2/4 and symmetric. Gait is normal.  MS:  No C/T/L paraspinal tenderness. No scoliosis. No joint effusions. Full joint ROM.  + Hammer toes. Range of motion of the lumbar spine is good  PSYCH:  Mood and affect NL. Judgement and insight NL.         Assessment/Plan:  Marvin Schafer was seen today for annual exam.    Diagnoses and all orders for this visit:    Annual physical exam  -     EKG 12 Lead  -     CBC Auto Differential; Future  -     Lipid Panel; Future  -     Comprehensive Metabolic Panel; Future  -     Hemoglobin A1C; Future  -     TSH without Reflex; Future  -     Vitamin D 25 Hydroxy; Future    Postmenopausal osteoporosis  -     DEXA Bone Density 2 Sites; Future  -     Vitamin D 25 Hydroxy; Future    Heart murmur  -     ECHO Complete 2D W Doppler W Color    MVP (mitral valve prolapse)  -     ECHO Complete 2D W Doppler W Color    Impaired fasting glucose  -     Comprehensive Metabolic Panel; Future  -     Hemoglobin A1C; Future    Hyperlipidemia, unspecified hyperlipidemia type  -     Lipid Panel; Future  -     Comprehensive Metabolic Panel; Future    Family history of coronary artery disease  -     Lipid Panel; Future    Screening for diabetes mellitus  -     Comprehensive Metabolic Panel; Future  -     Hemoglobin A1C; Future    RLS (restless legs syndrome)    Chronic bilateral low back pain without sciatica  --Pain medication contract and opioid consent form signed today           Preventive plan of care for Leelee Landeros        7/21/2020           Preventive Measures Status       Recommendations for screening   Colon Cancer Screen   Last colonoscopy: 6/29/2016 Test is due every 10 years. Next colonoscopy due 2026   Breast Cancer Screen  Last mammogram: 7/13/2020 Test is due yearly. Cervical Cancer Screen   Last PAP smear: 1998 Repeat screening is not clinically indicated due to hysterectomy status. Osteoporosis Screen   Last DXA scan: 6/29/2018. Declined treatment for Osteoporosis Repeat every 2 years--Due now.   Order provided   Diabetes Screen  Glucose (mg/dL)   Date Value   07/10/2019 94    Test recommended and ordered   Cholesterol Screen  Lab Results   Component Value Date    CHOL 252 (H) 07/10/2019    TRIG 108 07/10/2019     (H) 07/10/2019    LDLCALC 112 (H) 07/10/2019    Test recommended and ordered   Hepatitis C screening recommended for those born between 1945 and 1965--4/20/2016   Recommended no need to repeat at this time   HIV screening recommended for those ages 12-76  [facility] HIV--not on file Not clinically indicated at this time   Aspirin for Cardiovascular Prevention   Yes  continue a baby aspirin daily for cardiovascular disease prevention    Recommended Immunizations    Immunization History   Administered Date(s) Administered    Influenza Virus Vaccine 02/13/2014, 10/14/2015, 10/29/2018    Influenza, High Dose (Fluzone 65 yrs and older) 12/12/2017, 10/29/2018    Influenza, Quadv, IM, PF (6 mo and older Fluzone, Flulaval, Fluarix, and 3 yrs and older Afluria) 11/28/2016, 10/29/2018    Pneumococcal Conjugate 13-valent ([id number]) 05/31/2017    Pneumococcal Polysaccharide ([id number]) 06/12/2018    Tdap (Boostrix, Adacel) 03/15/2013    Zoster Live (Zostavax) 11/13/2014    Zoster Recombinant (Shingrix) 04/08/2019, 08/04/2019        Influenza vaccine:  recommended every fall    Pneumonia vaccine: Series completed    Tetanus vaccine:  tetanus and diptheria/Pertussis vaccine (Td/Tdap) recommended every 10 years- due 2023     Shingles vaccine:  Shingrx-- COMPLETE         Additional Recommendations   1. Use Sunscreen daily to help reduce the risk of skin cancer  2. Continue your healthy lifestyle  3. Update your eye exam every year  4. Always wear a seatbelt while in a car      Here are a few  Reliable websites with a variety of health and wellness information:   www.mylifecheck. heart. org     www.nutritionsource. org     www. americanheart. org     www. diabetes. org      www.menopause. org     www.[email or web address]     www.[email or web address])

## 2020-07-21 NOTE — LETTER
MEDICATION AGREEMENT     Lorenzoanjelica EdwardsKevin  [birth year]      For certain conditions, multiple classes of medications may be used to help better manage your symptoms, and to improve your ability to function at home, work and in social settings. However, these medications do have risks, which will be discussed with you, including addiction and dependency. The following prescribed medications need frequent monitoring and will require you to partner and assist in your healthcare. Medication  Dose, instructions and quantity as indicated on current prescription bottle Diagnosis/Reason(s) for Taking Category   Tramadol 50 mg 1 tab night. May use an additional tablet as needed   Chronic low back pain, Restless Legs Opioid                           Benefits and goals of Controlled Substance Medications: There are two potential goals for your treatment: (1) decreased pain and suffering (2) improved daily life functions. There are many possible treatments for your chronic condition(s), and, in addition to controlled substance medications, we will try alternatives such as physical therapy, yoga, massage, home daily exercise, meditation, relaxation techniques, injections, chiropractic manipulations, surgery, cognitive therapy, hypnosis and many medications that are not habit-forming. Use of controlled substance medications may be helpful, but they are unlikely to resolve all of your symptoms or restore all function. Risks of Controlled Substance Medications:    Opioid pain medications: These medications can lead to problems such as addiction/dependence, sedation, lightheadedness/dizziness, memory issues, falls, constipation, nausea, or vomiting. They may also impair the ability to drive or operate machinery. Additionally, these medications may lower testosterone levels, leading to loss of bone strength, stamina and sex drive.   They may cause problems with breathing, sleep apnea and stimulants, such as caffeine pills or energy drinks, certain weight loss supplements and oral decongestants. Dependence withdrawal symptoms may include depressed mood, loss of interest, suicidal thoughts, anxiety, fatigue, appetite changes and agitation. Testosterone replacement therapy:  Potential side effects include increased risk of stroke and heart attack, blood clots, increased blood pressure, increased cholesterol, enlarged prostate, sleep apnea, irritability/aggression and other mood disorders, and decreased fertility. Other:     1. I understand that I have the following responsibilities:  · I will take medications at the dose and frequency prescribed. · I will not increase or change how I take my medications without the approval of the health care provider who signs this Medication Agreement. · I will arrange for refills at the prescribed interval ONLY during regular office hours. I will not ask for refills earlier than agreed, after-hours, on holidays or on weekends. · I will obtain all refills for these medications at  ·  ____________________________________  pharmacy (phone number  ·  ________________________), with full consent for my provider and pharmacist to exchange information in writing or verbally. · I will not request any pain medications or controlled substances from other providers and will inform this provider of all other medications I am taking. · I will inform my other health care providers that I am taking these medications and of the existence of this [address]. In the event of an emergency, I will provide the same information to the emergency department providers. · I will protect my prescriptions and medications. I understand that lost or misplaced prescriptions will not be replaced. · I will keep medications only for my own use and will not share them with others. I will keep all medications away from children. · I agree to participate in any medical, psychological or psychiatric assessments recommended by my provider. · I will actively participate in any program designed to improve function, including social, physical, psychological and daily or work activities. 2. I will not use illegal or street drugs or another person's prescription. If I have an addiction problem with drugs or alcohol and my provider asks me to enter a program to address this issue, I agree to follow through. Such programs may include:  · 12-Step program and securing a sponsor  · Individual counseling   · Inpatient or outpatient treatment  · Other:_____________________________________________________________________________________________________________________________________________    If in treatment, I will request that a copy of the programs initial evaluation and treatment recommendations be sent to this provider and will not expect refills until that is received. I will also request written monthly updates be sent to this provider to verify my continuing treatment. 3. I will consent to drug screening upon my providers request to assure I am only taking the prescribed drugs, described in this MEDICATION AGREEMENT. I understand that a drug screen is a laboratory test in which a sample of my urine, blood or saliva is checked to see what drugs I have been taking. 4. I agree that I will treat the providers and staff at this office with respect at all times. I will keep all of my scheduled appointments, but if I need to cancel my appointment, I will do so a minimum of 24 hours before it is scheduled. 5. I understand that this provider may stop prescribing the medications listed if:  · I do not show any improvement in pain, or my activity has not improved. · I develop rapid tolerance or loss of improvement, as described in my treatment plan. · I develop significant side effects from the medication. · My behavior is inconsistent with the responsibilities outlined above, which may also result in my being prevented from receiving further care from this office. · Other:____________________________________________________________________    AGREEMENT:    I have read the above and have had all of my questions answered. For chronic disease management, I know that my symptoms can be managed with many types of treatments. A chronic medication trial may be part of my treatment, but I must be an active participant in my care. Medication therapy is only one part of my symptom management plan. In some cases, there may be limited scientific evidence to support the chronic use of certain medications to improve symptoms and daily function. Furthermore, in certain circumstances, there may be scientific information that suggests that use of chronic controlled substances may actually worsen my symptoms and increase my risk of unintentional death directly related to this medication therapy. I know that if my provider feels my risk from controlled medications is greater than my benefit, I will have my controlled substance medication(s) compassionately lowered or removed altogether. I agree to a controlled substance medication trial.      I further agree to allow this office to contact my HIPAA contact on file if there are concerns about my safety and use of controlled medications. I have agreed to use the following medications above as instructed by my physician and as stated in this [address].      Patient Signature:  ______________________  Date:7/21/2020 or _____________    Provider Signature:______________________  Date:7/21/2020 or _____________

## 2020-07-21 NOTE — PATIENT INSTRUCTIONS
need help quitting, talk to your doctor about stop-smoking programs and medicines. These can increase your chances of quitting for good. ? Eat heart-healthy foods such as fruits, vegetables, whole grains, fish, lean meats, and low-fat or nonfat dairy foods. Limit sodium, sugars, and alcohol. ? If your doctor recommends it, get more exercise. Walking is a good choice. Bit by bit, increase the amount you walk every day. Try for 30 minutes on most days of the week. You also may want to swim, bike, or do other activities. ? Stay at a healthy weight. Lose weight if you need to. When should you call for help? [id number] anytime you think you may need emergency care. For example, call if:  · You have severe trouble breathing. · You cough up pink, foamy mucus and you have trouble breathing. · You passed out (lost consciousness). · You have a seizure. · You have symptoms of a stroke. These may include:  ? Sudden numbness, tingling, weakness, or loss of movement in your face, arm, or leg, especially on only one side of your body. ? Sudden vision changes. ? Sudden trouble speaking. ? Sudden confusion or trouble understanding simple statements. ? Sudden problems with walking or balance. ? A sudden, severe headache that is different from past headaches. Call your doctor now or seek immediate medical care if:  · You have new or increased shortness of breath. · You feel dizzy or lightheaded, or you feel like you may faint. · You have sudden weight gain, such as more than 2 to 3 pounds in a day or 5 pounds in a week. (Your doctor may suggest a different range of weight gain.)  · You have increased swelling in your legs or feet. · You have a fever. Watch closely for changes in your health, and be sure to contact your doctor if you have any problems. Where can you learn more? Go to https://david.Aria [email or web address] org and sign in to your Gideros Mobile account.  Enter X691 in the Cupoint box to learn more about \"Heart Murmur: Care Instructions. \"     If you do not have an account, please click on the \"Sign Up Now\" link. Current as of: December 16, 2019               Content Version: 12.5  © 2006-2020 Healthwise, Incorporated. Care instructions adapted under license by [facility] ([facility]). If you have questions about a medical condition or this instruction, always ask your healthcare professional. [address] any warranty or liability for your use of this information. Patient Education        Mitral Valve Prolapse: Care Instructions  Your Care Instructions     Your heart is a muscular pump that has four chambers and four valves. Your mitral valve is like a one-way gate that regulates blood flow in your heart. It allows blood to flow in only one direction, from the upper to the lower heart chamber on the left side of the heart. Normally the mitral valve closes after blood flows through it. This keeps blood from leaking back into the upper chamber. In mitral valve prolapse, the flap of the valve bulges backward (prolapses) into the upper chamber. In most cases, a prolapse does not cause a problem. This is because the seal between the two heart chambers remains tight enough to prevent a leak, and blood moves normally through the valve. You will probably not need treatment for mitral valve prolapse. You may not have symptoms. But you have a risk for a problem called mitral valve regurgitation. With this problem, the flaps of the mitral valve do not seal tightly and blood leaks back into the upper chamber. Your doctor will likely check for signs of this problem at regular checkups. Follow-up care is a key part of your treatment and safety. Be sure to make and go to all appointments, and call your doctor if you are having problems. It's also a good idea to know your test results and keep a list of the medicines you take. How can you care for yourself at home?   Lifestyle changes  · Do not smoke. If you need help quitting, talk to your doctor about stop-smoking programs and medicines. These can increase your chances of quitting for good. · Eat heart-healthy foods such as fruits, vegetables, whole grains, fish, lean meats, and low-fat or nonfat dairy foods. Limit sodium, sugars, and alcohol. · Stay at a healthy weight. Lose weight if you need to. Activity  · Be active. If you have not been active before, talk with your doctor before starting an exercise program. Get at least 30 minutes of exercise on most days of the week. Walking is a good choice. You also may want to do other activities, such as running, swimming, cycling, or playing tennis or team sports. When should you call for help? Call your doctor now or seek immediate medical care if:  · You have new or increased shortness of breath. Watch closely for changes in your health, and be sure to contact your doctor if you are not getting better as expected. Where can you learn more? Go to https://MyCare.Cronote. org and sign in to your PlayMob account. Enter Q009 in the Cloud Sherpas box to learn more about \"Mitral Valve Prolapse: Care Instructions. \"     If you do not have an account, please click on the \"Sign Up Now\" link. Current as of: December 16, 2019               Content Version: 12.5  © 2444-7284 Healthwise, Incorporated. Care instructions adapted under license by [facility] ([facility] [place]). If you have questions about a medical condition or this instruction, always ask your healthcare professional. [place] [address] any warranty or liability for your use of this information. Patient Education        Osteoporosis: Care Instructions  Your Care Instructions     Osteoporosis causes bones to become thin and weak. It is much more common in women than in men. Osteoporosis may be very advanced before you know you have it.  Sometimes the first sign is a broken bone in the hip, spine, or wrist or sudden pain in your middle or lower back. Follow-up care is a key part of your treatment and safety. Be sure to make and go to all appointments, and call your doctor if you are having problems. It's also a good idea to know your test results and keep a list of the medicines you take. How can you care for yourself at home? · Your doctor may prescribe a bisphosphonate, such as risedronate (Actonel) or alendronate (Fosamax), for osteoporosis. If you are taking one of these medicines by mouth:  ? Take your medicine with a full glass of water when you first get up in the morning. ? Do not lie down, eat, drink a beverage, or take any other medicine for at least 30 minutes after taking the drug. This helps prevent stomach problems. ? Do not take your medicine late in the day if you forgot to take it in the morning. Skip it, and take the usual dose the next morning. ? If you have side effects, tell your doctor. He or she may prescribe another medicine. · Get enough calcium and vitamin D. The [place] of Medicine recommends adults younger than age 46 need 1,000 mg of calcium and 600 IU of vitamin D each day. Women ages 46 to 79 need 1,200 mg of calcium and 600 IU of vitamin D each day. Men ages 46 to 79 need 1,000 mg of calcium and 600 IU of vitamin D each day. Adults 71 and older need 1,200 mg of calcium and 800 IU of vitamin D each day. It's not clear if people who already have osteoporosis need more calcium and vitamin D than this. Talk to your doctor about what's right for you.  ? Eat foods rich in calcium, like yogurt, cheese, milk, and dark green vegetables. This is a good way to get the calcium you need. You can get vitamin D from eggs, fatty fish, cereal, and milk. ? Ask your doctor if you need to take a calcium plus vitamin D supplement. You may be able to get enough calcium and vitamin D through your diet. Be careful with supplements.  Adults ages 23 to 48 should not get more than 2,500 mg of calcium and 4,000 IU of vitamin D each day, whether it is from supplements and/or food. Adults ages 46 and older should not get more than 2,000 mg of calcium and 4,000 IU of vitamin D each day from supplements and/or food. · Limit alcohol to 2 drinks a day for men and 1 drink a day for women. Too much alcohol can cause health problems. · Do not smoke. Smoking puts you at a much higher risk for osteoporosis. If you need help quitting, talk to your doctor about stop-smoking programs and medicines. These can increase your chances of quitting for good. · Get regular bone-building exercise. Weight-bearing and resistance exercises keep bones healthy by working the muscles and bones against gravity. Start out at an exercise level that feels right for you. Add a little at a time until you can do the following:  ? Do 30 minutes of weight-bearing exercise on most days of the week. Walking, jogging, stair climbing, and dancing are good choices. ? Do resistance exercises with weights or elastic bands 2 to 3 days a week. · Reduce your risk of falls:  ? Wear supportive shoes with low heels and nonslip soles. ? Use a cane or walker, if you need it. Use shower chairs and bath benches. Put in handrails on stairways, around your shower or tub area, and near the toilet. ? Keep stairs, porches, and walkways well lit. Use night-lights. ? Remove throw rugs and other objects that are in the way. ? Avoid icy, wet, or slippery surfaces. ? Keep a cordless phone and a flashlight with new batteries by your bed. When should you call for help? Watch closely for changes in your health, and be sure to contact your doctor if you have any problems. Where can you learn more? Go to https://Qubitia Solutionspetevinewkecia.Birst. org and sign in to your Swan Island Networks account. Enter K100 in the Your Policy Manager box to learn more about \"Osteoporosis: Care Instructions. \"     If you do not have an account, please click on the \"Sign Up Now\" link.   Current as of: August 7, 8492               FZMXTXR Version: 12.5  © 5675-5501 Healthwise, [employer]. Care instructions adapted under license by [facility] ([facility]). If you have questions about a medical condition or this instruction, always ask your healthcare professional. [address] any warranty or liability for your use of this information. Patient Education        Deciding About Bisphosphonate Medicine for Osteoporosis  How can you decide about taking bisphosphonate medicine for osteoporosis? This information is right for you if you are a woman who has been through menopause. If that does not describe you, or if you're not sure, talk with your doctor about this decision. What is osteoporosis? Osteoporosis is a disease that affects your bones. It means you have bones that are thin and brittle and have lots of holes inside them like a sponge. This makes them easy to break. It can lead to broken bones (fractures), especially in the hip, spine, and wrist. These fractures may make it hard for you to live on your own. Bisphosphonates are the most common medicines used to prevent bone loss. They may be taken as a pill or an injection into a vein. Bisphosphonates slow the way bone dissolves and is absorbed by your body. They can increase bone thickness and strength. What are key points about this decision? · If you are at a higher risk of having a fracture, taking bisphosphonates is more likely to help you prevent a fracture. If your risk of a fracture is lower, it's less likely that these medicines will help you. · Bisphosphonates can cause problems with the jaw or thigh bone. But most women do not have these side effects. · Whether you take medicine or not, healthy habits can help protect your bones. Get enough calcium and vitamin D. Get regular weight-bearing exercise. Cut back on alcohol. And if you smoke, quit. Who is helped the most by bisphosphonates?   For women who have been through menopause:  · If you have osteoporosis (your T-score is -2.5 or less) or you have had a fracture, taking bisphosphonates lowers your risk of having a fracture. · If you haven't had a fracture and you have low bone density (your T-score is between -1.0 and -2.5, sometimes called osteopenia), taking bisphosphonates might lower your risk of having a fracture. This evidence is not as strong. What are the side effects of bisphosphonates? These medicines can have side effects, such as heartburn and belly pain. Certain bone problems have also been reported in women taking bisphosphonates. Out of 1,000 people, about 1 person has a bone side effect during a year of taking bisphosphonates. That means 999 out of 1,000 people do not have a bone side effect. These bone side effects include problems with the jaw bone (called osteonecrosis). They also might include a certain kind of fracture of the thigh bone (called an atypical fracture), but more research is needed to find out if taking bisphosphonates is a cause of these fractures. Your decision  Thinking about the facts and your feelings can help you make a decision that is right for you. Be sure you understand the benefits and risks of your options, and think about what else you need to do before you make the decision. Where can you learn more? Go to https://Wave Systems.TechShop. org and sign in to your Socogame account. Enter D022 in the [facility] box to learn more about \"Deciding About Bisphosphonate Medicine for Osteoporosis. \"     If you do not have an account, please click on the \"Sign Up Now\" link. Current as of: August 7, 2019               Content Version: 12.5  © 4106-0460 Healthwise, Incorporated. Care instructions adapted under license by [facility] ([facility]).  If you have questions about a medical condition or this instruction, always ask your healthcare professional. [place] [address] any warranty or liability for your use of this information. Patient Education        Prediabetes: Care Instructions  Overview     Prediabetes is a warning sign that you're at risk for getting type 2 diabetes. It means that your blood sugar is higher than it should be. But it's not high enough to be diabetes. The food you eat naturally turns into sugar. Your body uses the sugar for energy. Normally, an organ called the pancreas makes insulin. And insulin allows the sugar in your blood to get into your body's cells. But sometimes the body can't use insulin the right way. So the sugar stays in your blood instead. This is called insulin resistance. The buildup of sugar in your blood means you have prediabetes. The good news is that you may be able to prevent or delay diabetes. Making small lifestyle changes, like getting active and changing your eating habits, may help you get your blood sugar back to normal. You can work with your doctor to make a treatment plan. Follow-up care is a key part of your treatment and safety. Be sure to make and go to all appointments, and call your doctor if you are having problems. It's also a good idea to know your test results and keep a list of the medicines you take. How can you care for yourself at home? · Watch your weight. A healthy weight helps your body use insulin properly. · Limit the amount of calories, sweets, and unhealthy fat you eat. Ask your doctor if you should see a dietitian. A registered dietitian can help you create meal plans that fit your lifestyle. · Get at least 30 minutes of exercise on most days of the week. Exercise helps control your blood sugar. It also helps you maintain a healthy weight. Walking is a good choice. You also may want to do other activities, such as running, swimming, cycling, or playing tennis or team sports. · Do not smoke. Smoking can make prediabetes worse. If you need help quitting, talk to your doctor about stop-smoking programs and medicines.  These can increase your chances of quitting for good. · If your doctor prescribed medicines, take them exactly as prescribed. Call your doctor if you think you are having a problem with your medicine. You will get more details on the specific medicines your doctor prescribes. When should you call for help? Watch closely for changes in your health, and be sure to contact your doctor if:  · You have any symptoms of diabetes. These may include:  ? Being thirsty more often. ? Urinating more. ? Being hungrier. ? Losing weight. ? Being very tired. ? Having blurry vision. · You have a wound that will not heal.  · You have an infection that will not go away. · You have problems with your blood pressure. · You want more information about diabetes and how you can keep from getting it. Where can you learn more? Go to https://chmyrtleeb.Piaochong.com. org and sign in to your Green Throttle Games account. Enter I222 in the Mindlikes box to learn more about \"Prediabetes: Care Instructions. \"     If you do not have an account, please click on the \"Sign Up Now\" link. Current as of: December 20, 2019               Content Version: 12.5  © 6328-4919 Healthwise, Incorporated. Care instructions adapted under license by [facility] ([facility]). If you have questions about a medical condition or this instruction, always ask your healthcare professional. [place] [address] any warranty or liability for your use of this information. Patient Education        Well Visit, Over 72: Care Instructions  Your Care Instructions     Physical exams can help you stay healthy. Your doctor has checked your overall health and may have suggested ways to take good care of yourself. He or she also may have recommended tests. At home, you can help prevent illness with healthy eating, regular exercise, and other steps. Follow-up care is a key part of your treatment and safety.  Be sure to make and go to all appointments, and call your doctor if you are having problems. It's also a good idea to know your test results and keep a list of the medicines you take. How can you care for yourself at home? · Reach and stay at a healthy weight. This will lower your risk for many problems, such as obesity, diabetes, heart disease, and high blood pressure. · Get at least 30 minutes of exercise on most days of the week. Walking is a good choice. You also may want to do other activities, such as running, swimming, cycling, or playing tennis or team sports. · Do not smoke. Smoking can make health problems worse. If you need help quitting, talk to your doctor about stop-smoking programs and medicines. These can increase your chances of quitting for good. · Protect your skin from too much sun. When you're outdoors from 10 a.m. to 4 p.m., stay in the shade or cover up with clothing and a hat with a wide brim. Wear sunglasses that block UV rays. Even when it's cloudy, put broad-spectrum sunscreen (SPF 30 or higher) on any exposed skin. · See a dentist one or two times a year for checkups and to have your teeth cleaned. · Wear a seat belt in the car. Follow your doctor's advice about when to have certain tests. These tests can spot problems early. For men and women  · Cholesterol. Your doctor will tell you how often to have this done based on your overall health and other things that can increase your risk for heart attack and stroke. · Blood pressure. Have your blood pressure checked during a routine doctor visit. Your doctor will tell you how often to check your blood pressure based on your age, your blood pressure results, and other factors. · Diabetes. Ask your doctor whether you should have tests for diabetes. · Vision. Experts recommend that you have yearly exams for glaucoma and other age-related eye problems. · Hearing. Tell your doctor if you notice any change in your hearing. You can have tests to find out how well you hear.   · Colon cancer tests. Keep having colon cancer tests as your doctor recommends. You can have one of several types of tests. · Heart attack and stroke risk. At least every 4 to 6 years, you should have your risk for heart attack and stroke assessed. Your doctor uses factors such as your age, blood pressure, cholesterol, and whether you smoke or have diabetes to show what your risk for a heart attack or stroke is over the next 10 years. · Osteoporosis. Talk to your doctor about whether you should have a bone density test to find out whether you have thinning bones. Ask your doctor if you need to take a calcium plus vitamin D supplement. You may be able to get enough calcium and vitamin D through your diet. For women  · Pap test and pelvic exam. You may no longer need a Pap test. Talk with your doctor about whether to stop or continue to have Pap tests. · Breast exam and mammogram. Ask how often you should have a mammogram, which is an X-ray of your breasts. A mammogram can spot breast cancer before it can be felt and when it is easiest to treat. · Thyroid disease. Talk to your doctor about whether to have your thyroid checked as part of a regular physical exam. Women have an increased chance of a thyroid problem. For men  · Prostate exam. Talk to your doctor about whether you should have a blood test (called a PSA test) for prostate cancer. Experts recommend that you discuss the benefits and risks of the test with your doctor before you decide whether to have this test. Some experts say that men ages 79 and older no longer need testing. · Abdominal aortic aneurysm. Ask your doctor whether you should have a test to check for an aneurysm. You may need a test if you ever smoked or if your parent, brother, sister, or child has had an aneurysm. When should you call for help? Watch closely for changes in your health, and be sure to contact your doctor if you have any problems or symptoms that concern you.   Where can you learn

## 2020-07-31 RX ORDER — TRAMADOL HYDROCHLORIDE 50 MG/1
50 TABLET ORAL NIGHTLY
Qty: 30 TABLET | Refills: 0 | Status: CANCELLED | OUTPATIENT
Start: 2020-07-31 | End: 2020-08-30

## 2020-07-31 RX ORDER — TRAMADOL HYDROCHLORIDE 50 MG/1
50 TABLET ORAL NIGHTLY
Qty: 60 TABLET | Refills: 2 | Status: SHIPPED | OUTPATIENT
Start: 2020-07-31 | End: 2020-10-29

## 2020-10-05 ENCOUNTER — PATIENT MESSAGE (OUTPATIENT)
Dept: INTERNAL MEDICINE CLINIC | Age: 70
End: 2020-10-05

## 2020-10-05 NOTE — TELEPHONE ENCOUNTER
From: Chaz Galarza  To: Karl Killian DO  Sent: 10/5/2020 1:37 PM EDT  Subject: Non-Urgent Medical Question    I know you will be notified, but I got my flu shot today at [facility].

## 2020-10-12 DIAGNOSIS — Z82.49 FAMILY HISTORY OF CORONARY ARTERY DISEASE: ICD-10-CM

## 2020-10-12 DIAGNOSIS — Z13.1 SCREENING FOR DIABETES MELLITUS: ICD-10-CM

## 2020-10-12 DIAGNOSIS — E78.5 HYPERLIPIDEMIA, UNSPECIFIED HYPERLIPIDEMIA TYPE: ICD-10-CM

## 2020-10-12 DIAGNOSIS — R73.01 IMPAIRED FASTING GLUCOSE: ICD-10-CM

## 2020-10-12 DIAGNOSIS — M81.0 POSTMENOPAUSAL OSTEOPOROSIS: ICD-10-CM

## 2020-10-12 DIAGNOSIS — Z00.00 ANNUAL PHYSICAL EXAM: ICD-10-CM

## 2020-10-12 LAB
BASOPHILS ABSOLUTE: 0 K/UL (ref 0–0.2)
BASOPHILS RELATIVE PERCENT: 0.8 %
EOSINOPHILS ABSOLUTE: 0.3 K/UL (ref 0–0.6)
EOSINOPHILS RELATIVE PERCENT: 5.1 %
HCT VFR BLD CALC: 40.5 % (ref 36–48)
HEMOGLOBIN: 13.9 G/DL (ref 12–16)
LYMPHOCYTES ABSOLUTE: 2 K/UL (ref 1–5.1)
LYMPHOCYTES RELATIVE PERCENT: 33 %
MCH RBC QN AUTO: 32.5 PG (ref 26–34)
MCHC RBC AUTO-ENTMCNC: 34.3 G/DL (ref 31–36)
MCV RBC AUTO: 95 FL (ref 80–100)
MONOCYTES ABSOLUTE: 0.4 K/UL (ref 0–1.3)
MONOCYTES RELATIVE PERCENT: 6.6 %
NEUTROPHILS ABSOLUTE: 3.3 K/UL (ref 1.7–7.7)
NEUTROPHILS RELATIVE PERCENT: 54.5 %
PDW BLD-RTO: 13.6 % (ref 12.4–15.4)
PLATELET # BLD: 311 K/UL (ref 135–450)
PMV BLD AUTO: 7.9 FL (ref 5–10.5)
RBC # BLD: 4.26 M/UL (ref 4–5.2)
TSH SERPL DL<=0.05 MIU/L-ACNC: 0.95 UIU/ML (ref 0.27–4.2)
VITAMIN D 25-HYDROXY: 51.5 NG/ML
WBC # BLD: 6.1 K/UL (ref 4–11)

## 2020-10-13 LAB
A/G RATIO: 1.9 (ref 1.1–2.2)
ALBUMIN SERPL-MCNC: 4.5 G/DL (ref 3.4–5)
ALP BLD-CCNC: 94 U/L (ref 40–129)
ALT SERPL-CCNC: 18 U/L (ref 10–40)
ANION GAP SERPL CALCULATED.3IONS-SCNC: 18 MMOL/L (ref 3–16)
AST SERPL-CCNC: 25 U/L (ref 15–37)
BILIRUB SERPL-MCNC: <0.2 MG/DL (ref 0–1)
BUN BLDV-MCNC: 24 MG/DL (ref 7–20)
CALCIUM SERPL-MCNC: 10 MG/DL (ref 8.3–10.6)
CHLORIDE BLD-SCNC: 105 MMOL/L (ref 99–110)
CHOLESTEROL, TOTAL: 261 MG/DL (ref 0–199)
CO2: 23 MMOL/L (ref 21–32)
CREAT SERPL-MCNC: 0.7 MG/DL (ref 0.6–1.2)
ESTIMATED AVERAGE GLUCOSE: 119.8 MG/DL
GFR AFRICAN AMERICAN: >60
GFR NON-AFRICAN AMERICAN: >60
GLOBULIN: 2.4 G/DL
GLUCOSE BLD-MCNC: 92 MG/DL (ref 70–99)
HBA1C MFR BLD: 5.8 %
HDLC SERPL-MCNC: 114 MG/DL (ref 40–60)
LDL CHOLESTEROL CALCULATED: 121 MG/DL
POTASSIUM SERPL-SCNC: 4.4 MMOL/L (ref 3.5–5.1)
SODIUM BLD-SCNC: 146 MMOL/L (ref 136–145)
TOTAL PROTEIN: 6.9 G/DL (ref 6.4–8.2)
TRIGL SERPL-MCNC: 128 MG/DL (ref 0–150)
VLDLC SERPL CALC-MCNC: 26 MG/DL

## 2020-11-24 ENCOUNTER — HOSPITAL ENCOUNTER (OUTPATIENT)
Dept: NON INVASIVE DIAGNOSTICS | Age: 70
Discharge: HOME | End: 2020-11-24
Payer: MEDICARE

## 2020-11-24 LAB
LV EF: 60 %
LVEF MODALITY: NORMAL

## 2020-11-24 PROCEDURE — 93306 TTE W/DOPPLER COMPLETE: CPT

## 2020-12-08 RX ORDER — PRAVASTATIN SODIUM 20 MG
TABLET ORAL
Qty: 90 TABLET | Refills: 1 | Status: SHIPPED | OUTPATIENT
Start: 2020-12-08 | End: 2021-06-08

## 2020-12-22 ENCOUNTER — TELEPHONE (OUTPATIENT)
Dept: INTERNAL MEDICINE CLINIC | Age: 70
End: 2020-12-22

## 2020-12-22 NOTE — TELEPHONE ENCOUNTER
Because the friends who are positive for covid were not masked, they should still quarantine. There risk of getting is better than if had been inside, but if exposure over 20 minutes and with multiple people who may have been positive, still need to quarantine.

## 2020-12-22 NOTE — TELEPHONE ENCOUNTER
Spoke with pt and informed her she and her  would have to quarantine for 10 days after their covid exposure. Pt states she and pillo had a mask on and were at least 6-10 feet from their friends standing outside in a parking lot.    Please advise if further instructions

## 2021-01-20 DIAGNOSIS — M54.50 CHRONIC BILATERAL LOW BACK PAIN WITHOUT SCIATICA: Primary | ICD-10-CM

## 2021-01-20 DIAGNOSIS — G89.29 CHRONIC BILATERAL LOW BACK PAIN WITHOUT SCIATICA: Primary | ICD-10-CM

## 2021-01-20 RX ORDER — TRAMADOL HYDROCHLORIDE 50 MG/1
50 TABLET ORAL NIGHTLY
Qty: 30 TABLET | Refills: 0 | Status: SHIPPED | OUTPATIENT
Start: 2021-01-20 | End: 2021-02-16

## 2021-01-20 NOTE — TELEPHONE ENCOUNTER
Last appointment: 7/21/2020  Next appointment: 1/25/2021  Last refill: Never prescribed by [facility]. Was added to med list on 7/21/2020    Medication pended.  Please sign off on or refuse if not appropriate

## 2021-01-25 ENCOUNTER — OFFICE VISIT (OUTPATIENT)
Dept: INTERNAL MEDICINE CLINIC | Age: 71
End: 2021-01-25
Payer: MEDICARE

## 2021-01-25 VITALS
HEIGHT: 60 IN | BODY MASS INDEX: 18.46 KG/M2 | RESPIRATION RATE: 12 BRPM | SYSTOLIC BLOOD PRESSURE: 120 MMHG | TEMPERATURE: 96.1 F | DIASTOLIC BLOOD PRESSURE: 80 MMHG | HEART RATE: 74 BPM | WEIGHT: 94 LBS

## 2021-01-25 DIAGNOSIS — G89.29 CHRONIC BILATERAL LOW BACK PAIN WITHOUT SCIATICA: Primary | ICD-10-CM

## 2021-01-25 DIAGNOSIS — M54.50 CHRONIC BILATERAL LOW BACK PAIN WITHOUT SCIATICA: Primary | ICD-10-CM

## 2021-01-25 DIAGNOSIS — G25.81 RLS (RESTLESS LEGS SYNDROME): ICD-10-CM

## 2021-01-25 DIAGNOSIS — R73.01 IMPAIRED FASTING GLUCOSE: ICD-10-CM

## 2021-01-25 DIAGNOSIS — E78.5 HYPERLIPIDEMIA, UNSPECIFIED HYPERLIPIDEMIA TYPE: ICD-10-CM

## 2021-01-25 DIAGNOSIS — F40.243 ANXIETY WITH FLYING: ICD-10-CM

## 2021-01-25 DIAGNOSIS — R01.1 HEART MURMUR: ICD-10-CM

## 2021-01-25 PROCEDURE — 99214 OFFICE O/P EST MOD 30 MIN: CPT | Performed by: INTERNAL MEDICINE

## 2021-01-25 RX ORDER — MULTIVIT WITH MINERALS/LUTEIN
1000 TABLET ORAL 2 TIMES DAILY
COMMUNITY
End: 2021-01-26 | Stop reason: DRUGHIGH

## 2021-01-25 RX ORDER — PHENOL 1.4 %
1 AEROSOL, SPRAY (ML) MUCOUS MEMBRANE 2 TIMES DAILY
COMMUNITY
End: 2021-01-26 | Stop reason: DRUGHIGH

## 2021-01-25 RX ORDER — CHOLECALCIFEROL (VITAMIN D3) 125 MCG
1 CAPSULE ORAL DAILY
COMMUNITY
End: 2021-01-26 | Stop reason: DRUGHIGH

## 2021-01-25 ASSESSMENT — PATIENT HEALTH QUESTIONNAIRE - PHQ9
SUM OF ALL RESPONSES TO PHQ9 QUESTIONS 1 & 2: 0
SUM OF ALL RESPONSES TO PHQ QUESTIONS 1-9: 0
SUM OF ALL RESPONSES TO PHQ QUESTIONS 1-9: 0

## 2021-01-25 NOTE — PROGRESS NOTES
Hyperlipidemia     Impaired fasting glucose 6/12/2018    Lumbar facet arthropathy     Mild mitral valve prolapse     MVP (mitral valve prolapse) 7/21/2020    Osteopenia     PONV (postoperative nausea and vomiting)     RLS (restless legs syndrome)     Scoliosis     Spondylolisthesis        Review of Systems - As per HPI      OBJECTIVE:  /80   Pulse 74   Temp 96.1 °F (35.6 °C)   Resp 12   Ht 5' (1.524 m)   Wt 94 lb (42.6 kg)   BMI 18.36 kg/m²   GEN: NAD, A&O, Non-toxic  HEENT: NC/AT, SUZE, EOMI, anicteric  NECK: Supple. No thyromegaly. No JVD. LYMPH: No C/SC nodes. CV: Regular rhythm. Rate normal.  1/6 systolic murmurno worse  PULM: CTA  EXT: No edema. GI: Abdomen is soft, NT  NEURO: No focal or lateralizing deficits. No ataxia. Moves all extremities symmetrically. VASC:  No carotid bruits. Pulses symmetric  MS: Increased soft tissue tone of the lumbar paraspinals. No tenderness to palpation. ASSESSMENT/PLAN:    1. Chronic bilateral low back pain without sciatica  Chronic low back pain is well controlled  Patient will continue to use her nightly tramadol which is mostly used for restless legs but she does occasionally use an extra tramadol for low back pain  Continue to decrease risk of low back pain exacerbation with decreasing lifting  Continue home stretches  Patient will continue her tramadol. The medication is effective without adverse side effects    2. RLS (restless legs syndrome)  Condition is well controlled  At some point, another physician started her on tramadol which has helped her symptoms  Continue current medication for now  OARRS       3. Impaired fasting glucose  Condition is stable  Repeat lab at the time of her physical  Continue a sensible low simple sugar diet. 4. Hyperlipidemia, unspecified hyperlipidemia type  Condition is stable  She has a significantly elevated HDL. This is likely protective. Continue pravastatin    5.  Anxiety with flying  Condition stable. Patient will use Xanax when needed    6. Heart murmur  No change on exam  Consider repeating echocardiogram in a couple of years        Counseled on COVID-19 vaccine.

## 2021-01-25 NOTE — PATIENT INSTRUCTIONS
Patient Education        Back Care and Preventing Injuries: Care Instructions  Your Care Instructions     You can hurt your back doing many everyday activities: lifting a heavy box, bending down to garden, exercising at the gym, and even getting out of bed. But you can keep your back strong and healthy by doing some exercises. You also can follow a few tips for sitting, sleeping, and lifting to avoid hurting your back again. Talk to your doctor before you start an exercise program. Ask for help if you want to learn more about keeping your back healthy. Follow-up care is a key part of your treatment and safety. Be sure to make and go to all appointments, and call your doctor if you are having problems. It's also a good idea to know your test results and keep a list of the medicines you take. How can you care for yourself at home? · Stay at a healthy weight to avoid strain on your lower back. · Do not smoke. Smoking increases the risk of osteoporosis, which weakens the spine. If you need help quitting, talk to your doctor about stop-smoking programs and medicines. These can increase your chances of quitting for good. · Make sure you sleep in a position that maintains your back's normal curves and on a mattress that feels comfortable. Sleep on your side with a pillow between your knees, or sleep on your back with a pillow under your knees. These positions can reduce strain on your back. · When you get out of bed, lie on your side and bend both knees. Drop your feet over the edge of the bed as you push up with both arms. Scoot to the edge of the bed. Make sure your feet are in line with your rear end (buttocks), and then stand up. · If you must stand for a long time, put one foot on a stool, ledge, or box.   Exercise to strengthen your back and other muscles · Get at least 30 minutes of exercise on most days of the week. Walking is a good choice. You also may want to do other activities, such as running, swimming, cycling, or playing tennis or team sports. · Stretch your back muscles. Here are few exercises to try:  ? Lie on your back with your knees bent and your feet flat on the floor. Gently pull one bent knee to your chest. Put that foot back on the floor, and then pull the other knee to your chest. Hold for 15 to 30 seconds. Repeat 2 to 4 times. ? Do pelvic tilts. Lie on your back with your knees bent. Tighten your stomach muscles. Pull your belly button (navel) in and up toward your ribs. You should feel like your back is pressing to the floor and your hips and pelvis are slightly lifting off the floor. Hold for 6 seconds while breathing smoothly. · Keep your core muscles strong. The muscles of your back, belly (abdomen), and buttocks support your spine. ? Pull in your belly, and imagine pulling your navel toward your spine. Hold this for 6 seconds, then relax. Remember to keep breathing normally as you tense your muscles. ? Do curl-ups. Always do them with your knees bent. Keep your low back on the floor, and curl your shoulders toward your knees using a smooth, slow motion. Keep your arms folded across your chest. If this bothers your neck, try putting your hands behind your neck (not your head), with your elbows spread apart. ? Lie on your back with your knees bent and your feet flat on the floor. Tighten your belly muscles, and then push with your feet and raise your buttocks up a few inches. Hold this position 6 seconds as you continue to breathe normally, then lower yourself slowly to the floor. Repeat 8 to 12 times. ? If you like group exercise, try Pilates or yoga. These classes have poses that strengthen the core muscles.   Protect your back when you sit · Place a small pillow, a rolled-up towel, or a lumbar roll in the curve of your back if you need extra support. · Sit in a chair that is low enough to let you place both feet flat on the floor with both knees nearly level with your hips. If your chair or desk is too high, use a foot rest to raise your knees. · When driving, keep your knees nearly level with your hips. Sit straight, and drive with both hands on the steering wheel. Your arms should be in a slightly bent position. · Try a kneeling chair, which helps tilt your hips forward. This takes pressure off your lower back. · Try sitting on an exercise ball. It can rock from side to side, which helps keep your back loose. Lift properly  · Squat down, bending at the hips and knees only. If you need to, put one knee to the floor and extend your other knee in front of you, bent at a right angle (half kneeling). · Press your chest straight forward. This helps keep your upper back straight while keeping a slight arch in your low back. · Hold the load as close to your body as possible, at the level of your navel. · Use your feet to change direction, taking small steps. · Lead with your hips as you change direction. Keep your shoulders in line with your hips as you move. Do not twist your body. · Set down your load carefully, squatting with your knees and hips only. When should you call for help? Watch closely for changes in your health, and be sure to contact your doctor if you have any problems. Where can you learn more? Go to https://david.XiaoSheng.fm. org and sign in to your Five-Thirty account. Enter S810 in the FANCRU box to learn more about \"Back Care and Preventing Injuries: Care Instructions. \"     If you do not have an account, please click on the \"Sign Up Now\" link. Current as of: March 2, 2020               Content Version: 12.6  © 5435-1334 L[employer] SmartLight, Incorporated. Care instructions adapted under license by [facility] ([facility]). If you have questions about a medical condition or this instruction, always ask your healthcare professional. [address] any warranty or liability for your use of this information. Patient Education        Back Stretches: Exercises  Introduction  Here are some examples of exercises for stretching your back. Start each exercise slowly. Ease off the exercise if you start to have pain. Your doctor or physical therapist will tell you when you can start these exercises and which ones will work best for you. How to do the exercises  Overhead stretch   1. Stand comfortably with your feet shoulder-width apart. 2. Looking straight ahead, raise both arms over your head and reach toward the ceiling. Do not allow your head to tilt back. 3. Hold for 15 to 30 seconds, then lower your arms to your sides. 4. Repeat 2 to 4 times. Side stretch   1. Stand comfortably with your feet shoulder-width apart. 2. Raise one arm over your head, and then lean to the other side. 3. Slide your hand down your leg as you let the weight of your arm gently stretch your side muscles. Hold for 15 to 30 seconds. 4. Repeat 2 to 4 times on each side. Press-up   1. Lie on your stomach, supporting your body with your forearms. 2. Press your elbows down into the floor to raise your upper back. As you do this, relax your stomach muscles and allow your back to arch without using your back muscles. As your press up, do not let your hips or pelvis come off the floor. 3. Hold for 15 to 30 seconds, then relax. 4. Repeat 2 to 4 times. Relax and rest   1. Lie on your back with a rolled towel under your neck and a pillow under your knees. Extend your arms comfortably to your sides. 2. Relax and breathe normally. 3. Remain in this position for about 10 minutes. 4. If you can, do this 2 or 3 times each day. Follow-up care is a key part of your treatment and safety. Be sure to make and go to all appointments, and call your doctor if you are having problems. It's also a good idea to know your test results and keep a list of the medicines you take. Where can you learn more? Go to https://chpepiceweb.Shadow Puppet. org and sign in to your Slime Sandwicht account. Enter O641 in the ContentForest box to learn more about \"Back Stretches: Exercises. \"     If you do not have an account, please click on the \"Sign Up Now\" link. Current as of: March 2, 2020               Content Version: 12.6  © 3403-6152 [employer] '[employer] Gear, Incorporated. Care instructions adapted under license by [facility] ([facility]). If you have questions about a medical condition or this instruction, always ask your healthcare professional. [address] any warranty or liability for your use of this information. Patient Education        Low Back Pain: Exercises  Introduction  Here are some examples of exercises for you to try. The exercises may be suggested for a condition or for rehabilitation. Start each exercise slowly. Ease off the exercises if you start to have pain. You will be told when to start these exercises and which ones will work best for you. How to do the exercises  Press-up   5. Lie on your stomach, supporting your body with your forearms. 6. Press your elbows down into the floor to raise your upper back. As you do this, relax your stomach muscles and allow your back to arch without using your back muscles. As your press up, do not let your hips or pelvis come off the floor. 7. Hold for 15 to 30 seconds, then relax. 8. Repeat 2 to 4 times. Alternate arm and leg (bird dog) exercise   Do this exercise slowly. Try to keep your body straight at all times, and do not let one hip drop lower than the other. 5. Start on the floor, on your hands and knees. 6. Tighten your belly muscles. 7. Raise one leg off the floor, and hold it straight out behind you. Be careful not to let your hip drop down, because that will twist your trunk. 8. Hold for about 6 seconds, then lower your leg and switch to the other leg. 9. Repeat 8 to 12 times on each leg. 10. Over time, work up to holding for 10 to 30 seconds each time. 11. If you feel stable and secure with your leg raised, try raising the opposite arm straight out in front of you at the same time. Knee-to-chest exercise   5. Lie on your back with your knees bent and your feet flat on the floor. 6. Bring one knee to your chest, keeping the other foot flat on the floor (or keeping the other leg straight, whichever feels better on your lower back). 7. Keep your lower back pressed to the floor. Hold for at least 15 to 30 seconds. 8. Relax, and lower the knee to the starting position. 9. Repeat with the other leg. Repeat 2 to 4 times with each leg. 10. To get more stretch, put your other leg flat on the floor while pulling your knee to your chest.    Curl-ups   5. Lie on the floor on your back with your knees bent at a 90-degree angle. Your feet should be flat on the floor, about 12 inches from your buttocks. 6. Cross your arms over your chest. If this bothers your neck, try putting your hands behind your neck (not your head), with your elbows spread apart. 7. Slowly tighten your belly muscles and raise your shoulder blades off the floor. 8. Keep your head in line with your body, and do not press your chin to your chest.  9. Hold this position for 1 or 2 seconds, then slowly lower yourself back down to the floor. 10. Repeat 8 to 12 times. Pelvic tilt exercise   1. Lie on your back with your knees bent. 2. \"Brace\" your stomach. This means to tighten your muscles by pulling in and imagining your belly button moving toward your spine. You should feel like your back is pressing to the floor and your hips and pelvis are rocking back. 3. Hold for about 6 seconds while you breathe smoothly. 4. Repeat 8 to 12 times. Heel dig bridging   1. Lie on your back with both knees bent and your ankles bent so that only your heels are digging into the floor. Your knees should be bent about 90 degrees. 2. Then push your heels into the floor, squeeze your buttocks, and lift your hips off the floor until your shoulders, hips, and knees are all in a straight line. 3. Hold for about 6 seconds as you continue to breathe normally, and then slowly lower your hips back down to the floor and rest for up to 10 seconds. 4. Do 8 to 12 repetitions. Hamstring stretch in doorway   1. Lie on your back in a doorway, with one leg through the open door. 2. Slide your leg up the wall to straighten your knee. You should feel a gentle stretch down the back of your leg. 3. Hold the stretch for at least 15 to 30 seconds. Do not arch your back, point your toes, or bend either knee. Keep one heel touching the floor and the other heel touching the wall. 4. Repeat with your other leg. 5. Do 2 to 4 times for each leg. Hip flexor stretch   1. Kneel on the floor with one knee bent and one leg behind you. Place your forward knee over your foot. Keep your other knee touching the floor. 2. Slowly push your hips forward until you feel a stretch in the upper thigh of your rear leg. 3. Hold the stretch for at least 15 to 30 seconds. Repeat with your other leg. 4. Do 2 to 4 times on each side. Wall sit   1. Stand with your back 10 to 12 inches away from a wall. 2. Lean into the wall until your back is flat against it. 3. Slowly slide down until your knees are slightly bent, pressing your lower back into the wall. 4. Hold for about 6 seconds, then slide back up the wall. 5. Repeat 8 to 12 times. Follow-up care is a key part of your treatment and safety. Be sure to make and go to all appointments, and call your doctor if you are having problems. It's also a good idea to know your test results and keep a list of the medicines you take. Where can you learn more? Go to https://chpepiceweb.Aureliant. org and sign in to your Rifiniti account. Enter V186 in the Mobile Factory box to learn more about \"Low Back Pain: Exercises. \"     If you do not have an account, please click on the \"Sign Up Now\" link. Current as of: March 2, 2020               Content Version: 12.6  © 5342-2349 ciValue, Incorporated. Care instructions adapted under license by [facility] ([facility]). If you have questions about a medical condition or this instruction, always ask your healthcare professional. [address] any warranty or liability for your use of this information.

## 2021-01-26 RX ORDER — ANTACID TABLETS 500 MG/1
1 TABLET, CHEWABLE ORAL 2 TIMES DAILY
Qty: 90 TABLET | Refills: 0
Start: 2021-01-26 | End: 2021-01-26

## 2021-01-26 RX ORDER — CALCIUM NO.38/D3/MAG/BORON ASP 500MG/15ML
LIQUID (ML) ORAL
COMMUNITY
Start: 2021-01-26

## 2021-01-26 RX ORDER — MELATONIN
1000 2 TIMES DAILY
Qty: 90 TABLET | Refills: 1
Start: 2021-01-26 | End: 2021-01-26

## 2021-04-13 DIAGNOSIS — G89.29 CHRONIC BILATERAL LOW BACK PAIN WITHOUT SCIATICA: ICD-10-CM

## 2021-04-13 DIAGNOSIS — M54.50 CHRONIC BILATERAL LOW BACK PAIN WITHOUT SCIATICA: ICD-10-CM

## 2021-04-13 RX ORDER — TRAMADOL HYDROCHLORIDE 50 MG/1
TABLET ORAL
Qty: 60 TABLET | Refills: 0 | Status: SHIPPED | OUTPATIENT
Start: 2021-04-13 | End: 2021-06-08

## 2021-06-01 DIAGNOSIS — F40.243 ANXIETY WITH FLYING: ICD-10-CM

## 2021-06-02 RX ORDER — ALPRAZOLAM 0.25 MG/1
TABLET ORAL
Qty: 10 TABLET | Refills: 0 | Status: SHIPPED | OUTPATIENT
Start: 2021-06-02 | End: 2021-08-31 | Stop reason: SDUPTHER

## 2021-06-08 DIAGNOSIS — M54.50 CHRONIC BILATERAL LOW BACK PAIN WITHOUT SCIATICA: ICD-10-CM

## 2021-06-08 DIAGNOSIS — G89.29 CHRONIC BILATERAL LOW BACK PAIN WITHOUT SCIATICA: ICD-10-CM

## 2021-06-08 RX ORDER — TRAMADOL HYDROCHLORIDE 50 MG/1
TABLET ORAL
Qty: 60 TABLET | Refills: 0 | Status: SHIPPED | OUTPATIENT
Start: 2021-06-08 | End: 2021-07-29

## 2021-06-08 RX ORDER — PRAVASTATIN SODIUM 20 MG
TABLET ORAL
Qty: 90 TABLET | Refills: 0 | Status: SHIPPED | OUTPATIENT
Start: 2021-06-08 | End: 2021-08-31 | Stop reason: SDUPTHER

## 2021-06-08 NOTE — TELEPHONE ENCOUNTER
Last appointment: 1/25/2021  Next appointment: 7/28/2021  Last refill: 12/8/20 and 4/13/21 [facility]  Podiatry  [address] [place] [address] - [address] [place] [address]  Phone: [phone]  Fax:     [facility]  Wound Care  [address] [place] [address]  Phone: [phone]  Fax: [phone]  Follow Up Time:

## 2021-07-28 DIAGNOSIS — M54.50 CHRONIC BILATERAL LOW BACK PAIN WITHOUT SCIATICA: ICD-10-CM

## 2021-07-28 DIAGNOSIS — G89.29 CHRONIC BILATERAL LOW BACK PAIN WITHOUT SCIATICA: ICD-10-CM

## 2021-07-29 RX ORDER — TRAMADOL HYDROCHLORIDE 50 MG/1
TABLET ORAL
Qty: 60 TABLET | Refills: 0 | Status: SHIPPED | OUTPATIENT
Start: 2021-07-29 | End: 2021-08-28

## 2021-08-31 ENCOUNTER — OFFICE VISIT (OUTPATIENT)
Dept: INTERNAL MEDICINE CLINIC | Age: 71
End: 2021-08-31
Payer: MEDICARE

## 2021-08-31 VITALS
HEIGHT: 60 IN | BODY MASS INDEX: 18.06 KG/M2 | HEART RATE: 97 BPM | DIASTOLIC BLOOD PRESSURE: 78 MMHG | TEMPERATURE: 97.2 F | OXYGEN SATURATION: 98 % | WEIGHT: 92 LBS | SYSTOLIC BLOOD PRESSURE: 126 MMHG

## 2021-08-31 DIAGNOSIS — G25.81 RLS (RESTLESS LEGS SYNDROME): ICD-10-CM

## 2021-08-31 DIAGNOSIS — Z00.00 ANNUAL PHYSICAL EXAM: Primary | ICD-10-CM

## 2021-08-31 DIAGNOSIS — E78.5 HYPERLIPIDEMIA, UNSPECIFIED HYPERLIPIDEMIA TYPE: ICD-10-CM

## 2021-08-31 DIAGNOSIS — G89.29 CHRONIC BILATERAL LOW BACK PAIN WITHOUT SCIATICA: ICD-10-CM

## 2021-08-31 DIAGNOSIS — M81.0 POSTMENOPAUSAL OSTEOPOROSIS: ICD-10-CM

## 2021-08-31 DIAGNOSIS — F40.243 ANXIETY WITH FLYING: ICD-10-CM

## 2021-08-31 DIAGNOSIS — R73.01 IMPAIRED FASTING GLUCOSE: ICD-10-CM

## 2021-08-31 DIAGNOSIS — H61.21 CERUMINOSIS, RIGHT: ICD-10-CM

## 2021-08-31 DIAGNOSIS — Z13.1 SCREENING FOR DIABETES MELLITUS: ICD-10-CM

## 2021-08-31 DIAGNOSIS — M54.50 CHRONIC BILATERAL LOW BACK PAIN WITHOUT SCIATICA: ICD-10-CM

## 2021-08-31 PROCEDURE — 99397 PER PM REEVAL EST PAT 65+ YR: CPT | Performed by: INTERNAL MEDICINE

## 2021-08-31 RX ORDER — TRAMADOL HYDROCHLORIDE 50 MG/1
50 TABLET ORAL EVERY 6 HOURS PRN
COMMUNITY
End: 2021-08-31 | Stop reason: SDUPTHER

## 2021-08-31 RX ORDER — PRAVASTATIN SODIUM 20 MG
TABLET ORAL
Qty: 90 TABLET | Refills: 3 | Status: SHIPPED | OUTPATIENT
Start: 2021-08-31 | End: 2022-08-30

## 2021-08-31 RX ORDER — TRAMADOL HYDROCHLORIDE 50 MG/1
50 TABLET ORAL DAILY
Qty: 60 TABLET | Refills: 0 | Status: SHIPPED | OUTPATIENT
Start: 2021-08-31 | End: 2021-10-18

## 2021-08-31 RX ORDER — ALPRAZOLAM 0.25 MG/1
TABLET ORAL
Qty: 10 TABLET | Refills: 0 | Status: SHIPPED | OUTPATIENT
Start: 2021-08-31 | End: 2022-03-02 | Stop reason: SDUPTHER

## 2021-08-31 NOTE — PATIENT INSTRUCTIONS
Preventive plan of care for Damion Wall        8/31/2021           Preventive Measures Status       Recommendations for screening   Colon Cancer Screen   Last colonoscopy: 6/29/2016 Test is due every 10 years. Next colonoscopy due 2026   Breast Cancer Screen  Last mammogram: 7/13/2020 Test is due yearly. Overdue   Cervical Cancer Screen   Last PAP smear: 1998 Repeat screening is not clinically indicated due to hysterectomy status. Osteoporosis Screen   Last DXA scan: 6/29/2018. Declined treatment for Osteoporosis You do have Osteoporosis.      Diabetes Screen  Glucose (mg/dL)   Date Value   10/12/2020 92    Test recommended and ordered   Cholesterol Screen  Lab Results   Component Value Date    CHOL 261 (H) 10/12/2020    TRIG 128 10/12/2020     (H) 10/12/2020    LDLCALC 121 (H) 10/12/2020    Test recommended and ordered   Hepatitis C screening recommended for those born between 1945 and 1965--4/20/2016   No need to repeat at this time   HIV screening recommended for those ages 12-76  [facility] HIV--not on file Not clinically indicated at this time   Aspirin for Cardiovascular Prevention   Yes Continue a baby aspirin daily for cardiovascular disease prevention    Recommended Immunizations    Immunization History   Administered Date(s) Administered    COVID-19, Moderna, PF, 100mcg/0.5mL 02/03/2021, 03/04/2021    Influenza Virus Vaccine 02/13/2014, 10/14/2015, 10/29/2018    Influenza, High Dose (Fluzone 65 yrs and older) 12/12/2017, 10/29/2018, 10/05/2020    Influenza, Quadv, IM, PF (6 mo and older Fluzone, Flulaval, Fluarix, and 3 yrs and older Afluria) 11/28/2016, 10/29/2018    Pneumococcal Conjugate 13-valent (Vpipymf13) 05/31/2017    Pneumococcal Polysaccharide (Cptalspdz14) 06/12/2018    Tdap (Boostrix, Adacel) 03/15/2013    Zoster Live (Zostavax) 11/13/2014    Zoster Recombinant (Shingrix) 04/08/2019, 08/04/2019        Influenza vaccine:  recommended every fall    Pneumonia vaccine: COMPLETE    Tetanus vaccine:  tetanus and diptheria/Pertussis vaccine (Td/Tdap) recommended every 10 years- due 2023     Shingles vaccine:  Shingrx-- COMPLETE         Additional Recommendations   1. Use Sunscreen daily to help reduce the risk of skin cancer  2. Continue your healthy lifestyle  3. Update your eye exam every year  4. Always wear a seatbelt while in a car  5. Always wear a helmet while riding a bike      Here are a few  Reliable websites with a variety of health and wellness information:   www.mylifecheck. heart. org     www.nutritionsource. org     www. americanheart. org     www. diabetes. org      www.menopause. org     www.ISGN CorporationTimePoints     www.Cashplay.co ([facility] site)      Patient Education        Earwax Blockage: Care Instructions  Your Care Instructions     Earwax is a natural substance that protects the ear canal. Normally, earwax drains from the ears and does not cause problems. Sometimes earwax builds up and hardens. Earwax blockage (also called cerumen impaction) can cause some loss of hearing and pain. When wax is tightly packed, you will need to have your doctor remove it. Follow-up care is a key part of your treatment and safety. Be sure to make and go to all appointments, and call your doctor if you are having problems. It's also a good idea to know your test results and keep a list of the medicines you take. How can you care for yourself at home? · Do not try to remove earwax with cotton swabs, fingers, or other objects. This can make the blockage worse and damage the eardrum. · If your doctor recommends that you try to remove earwax at home:  ? Soften and loosen the earwax with warm mineral oil. You also can try hydrogen peroxide mixed with an equal amount of room temperature water. Place 2 drops of the fluid, warmed to body temperature, in the ear two times a day for up to 5 days.   ? Once the wax is loose and soft, all that is usually needed to remove it from the ear canal is a gentle, warm shower. Direct the water into the ear, then tip your head to let the earwax drain out. Dry your ear thoroughly with a hair dryer set on low. Hold the dryer several inches from your ear. ? If the warm mineral oil and shower do not work, use an over-the-counter wax softener. Read and follow all instructions on the label. After using the wax softener, use an ear syringe to gently flush the ear. Make sure the flushing solution is body temperature. Cool or hot fluids in the ear can cause dizziness. When should you call for help? Call your doctor now or seek immediate medical care if:    · Pus or blood drains from your ear.     · Your ears are ringing or feel full.     · You have a loss of hearing. Watch closely for changes in your health, and be sure to contact your doctor if:    · You have pain or reduced hearing after 1 week of home treatment.     · You have any new symptoms, such as nausea or balance problems. Where can you learn more? Go to https://Military WrapspeBlekko.RPX Corporation. org and sign in to your APX account. Enter Q663 in the [facility] box to learn more about \"Earwax Blockage: Care Instructions. \"     If you do not have an account, please click on the \"Sign Up Now\" link. Current as of: October 19, 2020               Content Version: 12.9  © 7184-0095 Healthwise, Incorporated. Care instructions adapted under license by [facility] [address] If you have questions about a medical condition or this instruction, always ask your healthcare professional. [place] [address] any warranty or liability for your use of this information.

## 2021-08-31 NOTE — PROGRESS NOTES
[facility]) Physicians  Internal Medicine  Patient Encounter  Jb Goldberg D.O., [facility] Preventative Physical    Chief Complaint   Patient presents with    Annual Exam     Yearly physical        HPI-- 73 y.o. female presents today for a complete annual preventative physical.  The patient continues to decline a Medicare Wellness visit. Medical/Surgical Histories     Past Medical History:   Diagnosis Date    Arthropathy of cervical facet joint     Cervical spondylosis     Chronic bilateral low back pain without sciatica 11/28/2016    DJD (degenerative joint disease), cervical 10/13/2010    DJD (degenerative joint disease), lumbar 9/10/2010    Heart murmur 7/21/2020    Hyperlipidemia     Impaired fasting glucose 6/12/2018    Lumbar facet arthropathy     Mild mitral valve prolapse     MVP (mitral valve prolapse) 7/21/2020    Osteopenia     PONV (postoperative nausea and vomiting)     RLS (restless legs syndrome)     Scoliosis     Spondylolisthesis           Past Surgical History:   Procedure Laterality Date    CYSTOSCOPY      HYSTERECTOMY, TOTAL ABDOMINAL  1998    ROTATOR CUFF REPAIR Right 2008           Medications/Allergies     Medication Sig    traMADol (ULTRAM) 50 MG tablet Take 50 mg by mouth every 6 hours as needed for Pain.  pravastatin (PRAVACHOL) 20 MG tablet TAKE ONE TABLET BY MOUTH ONCE NIGHTLY    Ascorbic Acid (VITAMIN C) 500 MG CHEW Take by mouth daily    Multiple Minerals-Vitamins (CALCIUM & VIT D3 BONE HEALTH) K[employer]y Lane[employer] brand vitamin. Calcium/vitamin D3 twice daily    aspirin EC 81 MG EC tablet Take 1 tablet by mouth daily     No current facility-administered medications for this visit. No Known Allergies      Substance Use History     Social History     Tobacco Use    Smoking status: Never Smoker    Smokeless tobacco: Never Used   Vaping Use    Vaping Use: Never used   Substance Use Topics    Alcohol use:  Yes     Alcohol/week: 2.0 standard drinks     Types: 2 Glasses of wine per week    Drug use: No          Family History     Family History   Problem Relation Age of Onset    Diabetes Mother     Heart Disease Mother 67        CAD, CABG    Heart Disease Father 37        CAD,CABG, Stents x 2    Diabetes Brother     Heart Disease Brother 76        CAD, Arrhythmia    Diabetes Maternal Grandmother               REVIEW OF SYSTEMS:    CONSTITUTIONAL:  Neg   Recent weight changes, fatigue, fever, chills or night sweats, anorexia, Sleep difficulties  EYES: Neg  Blurry vision, loss of vision, double vision, tearing, itching, eye pain. EARS:  Neg Hearing loss, tinnitus, vertigo, discharge or otalgia. NOSE:  Neg  Rhinorrhea, sneezing, itching, epistaxis, or snoring.  + Seasonal allergy symptoms-- runny nose, sneezing. MOUTH/THROAT:  Neg Bleeding gums, hoarseness, sore throat, dysphagia, throat infections, or dentures  RESPIRATORY:  Neg SOB ,wheeze, cough, sputum, hemoptysis. No report of + TB test.    CARDIOVASCULAR:  Neg Chest pain, palpitations, dyspnea on exertion, orthopnea, paroxysmal nocturnal dyspnea or edema of extremities, or claudication. + MVP, + Murmur. GASTROINTESTINAL:  Neg   Nausea, vomiting, or diarrhea, constipation, hematemesis, heart burn, dysphagia,change in bowel movements or stool caliber, hematochezia, melena, abdominal pain, or food intolerance. Colonoscopy: Yes, 2016, 10 year recall. GENITOURINARY:  Neg  Urinary frequency, hesitancy, urgency, polyuria, dysuria, hematuria, nocturia, incontinence, change in stream, genital pain or swelling, kidney stones, STD's. PAP/SHAHID: Yes, no longer requires. HEMATOLOGIC/LYMPHATIC:  Neg  Anemia, bleeding dyscrasias, easy bruising, blood clots (DVT/PE), transfusions, or enlarged lymph nodes  MUSCULOSKELETAL:  Neg  New myalgias, bone pain, joint pain, joint swelling, radicular pain, or fractures. + Chronic low back pain-- Much improved with yoga. Tramadol as needed during the day. NEUROLOGICAL:  Neg  Loss of Consciousness, memeory loss or forgetfulness, confusion, difficulty concentrating, seizures, insomina, aphasia or dysarthria unilateral weakness or paresthesias, ataxia, headaches, syncope, tremor, or H/O head trauma.  + RLS-- Treated with Tramadol initially years ago. This has been effective. Without the Tramadol, she will wake with jumping and fidgety legs. PSYCHIATRIC:  Neg  Depression, anxiety, personality changes, nervousness, drug or alcohol use/abuse, H/O psych counseling: No, Psychotropics: Yes. SKIN :  Neg  Rash, nail changes, sun burns, tattoos, change in moles, or skin color changes  ENDOCRINE:  Neg  Polydipsia,polyuria,abnormal weight changes,heat /cold intolerance, Hair changes. No H/O Diabetes or Thyroid disease.  + H/O IFG. She has osteoporosis. She has declined therapy. She denies fractures. Preventive Care:    Health Maintenance   Topic Date Due    Breast cancer screen  07/13/2021    Annual Wellness Visit (AWV)  08/31/2023 (Originally 12/22/2020)    Flu vaccine (1) 09/01/2021    A1C test (Diabetic or Prediabetic)  10/12/2021    Lipid screen  10/12/2021    DTaP/Tdap/Td vaccine (2 - Td or Tdap) 03/15/2023    Colon cancer screen colonoscopy  06/29/2026    DEXA (modify frequency per FRAX score)  Completed    Shingles Vaccine  Completed    Pneumococcal 65+ years Vaccine  Completed    COVID-19 Vaccine  Completed    Hepatitis C screen  Completed    Hepatitis A vaccine  Aged Out    Hepatitis B vaccine  Aged Out    Hib vaccine  Aged Out    Meningococcal (ACWY) vaccine  Aged Out      Hx abnormal PAP: yes - Years past.  ROMERO.     Sexual activity: single partner, contraception - status post hysterectomy   Self-breast exams: yes  Last eye exam: 2019, normal. Yearly   Exercise: walks 6 time(s) per week 2 miles, yoga, Pilate's, Pickle Ball, cycling  Seatbelt use: Yes  Sun Screen:Yes,  Regularly  Dentist: Every 6 months, UTD      Physical Exam    Vitals:    08/31/21 1051   BP: 126/78   Pulse: 97   Temp: 97.2 °F (36.2 °C)   SpO2: 98%   Weight: 92 lb (41.7 kg)   Height: 5' (1.524 m)     Body mass index is 17.97 kg/m². Wt Readings from Last 3 Encounters:   08/31/21 92 lb (41.7 kg)   01/25/21 94 lb (42.6 kg)   07/21/20 92 lb (41.7 kg)     BP Readings from Last 3 Encounters:   08/31/21 126/78   01/25/21 120/80   07/21/20 120/60        GEN:  73 y.o. female who is in NAD, A&O. She appears stated age and well nourished. She is cooperative and pleasant. HEAD:  NC/AT, no lesions. EYES:  REAL, EOMI, No scleral icterus or conjunctival injection or discharge. EARS:  EAC's clear, TM's normal.  NOSE:  Nasal cavity is clear. No mucosal congestion or discharge. Sinuses are nontender. MOUTH & THROAT:  Oral cavity is clear without mucosal lesions. Tongue is midline. Dentition is in good repair. No pharyngeal erythema or exudate. NECK:  Supple. Full ROM. Trachea is midline. No increased JVD. No thyromegaly or nodules. No masses  LYMPH: No C/SC/A/F nodes  CARDIAC:  S1S2 NL. Regular rhythm. No ectopy. 8-0/4 systolic murmur heard best at the apex. Murmur accentuated with standing, and Valsalva (decreased venous return)  VASC:  Pedal pulses 2/4. Carotid upstrokes 2+. No bruits noted. PULM:  Lungs are CTA. Symmetric breath sounds noted. AP Diameter NL. GI:  Abdomen is soft and nontender. No distension. No organomegaly. No masses. No pulsatile masses. EXT:  No Cyanosis or clubbing. No edema. SKIN: Warm and dry, normal turgor, no rash or lesions of concern. NEURO:  Cranial nerves 2-12 are NL. Speech fluent and coherent. Strength is 5/5 in all muscle groups. No sensory deficits. No focal or lateralizing deficits. Reflexes 2/4 and symmetric. Gait is normal.  MS:  No C/T/L paraspinal tenderness. No scoliosis. No joint effusions. Full joint ROM.  + Hammer toes.   Range of motion of the lumbar spine is good  PSYCH:  Mood and affect NL. Judgement and insight NL.          ASSESSMENT/PLAN:    1. Annual physical exam  All care gaps identified and addressed  Flu vaccine in the next couple of weeks  Patient counseled on COVID-19 vaccines and possible booster  - CBC Auto Differential; Future  - Comprehensive Metabolic Panel; Future  - Lipid Panel; Future  - TSH without Reflex; Future  - Hemoglobin A1C; Future  - Vitamin D 25 Hydroxy; Future    2. Chronic bilateral low back pain without sciatica  Condition is well controlled  Continue tramadol  - traMADol (ULTRAM) 50 MG tablet; Take 1 tablet by mouth daily for 30 days. May take another tab daily as needed. Dispense: 60 tablet; Refill: 0    3. RLS (restless legs syndrome)    - traMADol (ULTRAM) 50 MG tablet; Take 1 tablet by mouth daily for 30 days. May take another tab daily as needed. Dispense: 60 tablet; Refill: 0    4. Anxiety with flying  Patient will continue Xanax as needed  We have taken a few minutes to review the potential adverse side effects and habit-forming nature of the medication  - ALPRAZolam (XANAX) 0.25 MG tablet; Take 1 tab 30-45 minutes before flight  Dispense: 10 tablet; Refill: 0    5. Ceruminosis, right  Over-the-counter remedy for cerumen removal    6. Impaired fasting glucose    - Comprehensive Metabolic Panel; Future  - Hemoglobin A1C; Future    7. Hyperlipidemia, unspecified hyperlipidemia type    - Comprehensive Metabolic Panel; Future  - Lipid Panel; Future    8. Screening for diabetes mellitus    - Comprehensive Metabolic Panel; Future  - Hemoglobin A1C; Future    9. Postmenopausal osteoporosis  Patient no longer desires DEXA scans. She will not take therapy though indicated  - Vitamin D 25 Hydroxy; Future               Preventive plan of care for Jana Posadaings        8/31/2021           Preventive Measures Status       Recommendations for screening   Colon Cancer Screen   Last colonoscopy: 6/29/2016 Test is due every 10 years.   Next colonoscopy due 2026 Breast Cancer Screen  Last mammogram: 7/13/2020 Test is due yearly. Overdue   Cervical Cancer Screen   Last PAP smear: 1998 Repeat screening is not clinically indicated due to hysterectomy status. Osteoporosis Screen   Last DXA scan: 6/29/2018. Declined treatment for Osteoporosis You do have Osteoporosis. Diabetes Screen  Glucose (mg/dL)   Date Value   10/12/2020 92    Test recommended and ordered   Cholesterol Screen  Lab Results   Component Value Date    CHOL 261 (H) 10/12/2020    TRIG 128 10/12/2020     (H) 10/12/2020    LDLCALC 121 (H) 10/12/2020    Test recommended and ordered   Hepatitis C screening recommended for those born between 1945 and 1965--4/20/2016   No need to repeat at this time   HIV screening recommended for those ages 12-76  [facility] HIV--not on file Not clinically indicated at this time   Aspirin for Cardiovascular Prevention   Yes Continue a baby aspirin daily for cardiovascular disease prevention    Recommended Immunizations    Immunization History   Administered Date(s) Administered    COVID-19, Moderna, PF, 100mcg/0.5mL 02/03/2021, 03/04/2021    Influenza Virus Vaccine 02/13/2014, 10/14/2015, 10/29/2018    Influenza, High Dose (Fluzone 65 yrs and older) 12/12/2017, 10/29/2018, 10/05/2020    Influenza, Quadv, IM, PF (6 mo and older Fluzone, Flulaval, Fluarix, and 3 yrs and older Afluria) 11/28/2016, 10/29/2018    Pneumococcal Conjugate 13-valent ([id number]) 05/31/2017    Pneumococcal Polysaccharide ([id number]) 06/12/2018    Tdap (Boostrix, Adacel) 03/15/2013    Zoster Live (Zostavax) 11/13/2014    Zoster Recombinant (Shingrix) 04/08/2019, 08/04/2019        Influenza vaccine:  recommended every fall    Pneumonia vaccine: COMPLETE    Tetanus vaccine:  tetanus and diptheria/Pertussis vaccine (Td/Tdap) recommended every 10 years- due 2023     Shingles vaccine:  Shingrx-- COMPLETE         Additional Recommendations   1.  Use Sunscreen daily to help reduce the risk of skin cancer  2. Continue your healthy lifestyle  3. Update your eye exam every year  4. Always wear a seatbelt while in a car  5. Always wear a helmet while riding a bike      Here are a few  Reliable websites with a variety of health and wellness information:   www.mylifecheck. heart. org     www.nutritionsource. org     www. americanheart. org     www. diabetes. org      www.menopause. org     www.[facility]     www.[facility] [employer])

## 2021-09-20 ENCOUNTER — TELEPHONE (OUTPATIENT)
Dept: INTERNAL MEDICINE CLINIC | Age: 71
End: 2021-09-20

## 2021-09-20 NOTE — TELEPHONE ENCOUNTER
Patient fell outside on her wooden ramp about 30 minutes ago. She landed on her rear end and hit her head pretty hard. She does not have a bump on her head but it did cause her to have a headache and ringing in her ears. Please call patient and advise her on whether or not she needs to be seen and warning signs/symptoms to look out for. Thanks.

## 2021-10-18 DIAGNOSIS — M54.50 CHRONIC BILATERAL LOW BACK PAIN WITHOUT SCIATICA: ICD-10-CM

## 2021-10-18 DIAGNOSIS — G89.29 CHRONIC BILATERAL LOW BACK PAIN WITHOUT SCIATICA: ICD-10-CM

## 2021-10-18 DIAGNOSIS — G25.81 RLS (RESTLESS LEGS SYNDROME): ICD-10-CM

## 2021-10-18 RX ORDER — TRAMADOL HYDROCHLORIDE 50 MG/1
TABLET ORAL
Qty: 60 TABLET | Refills: 0 | Status: SHIPPED | OUTPATIENT
Start: 2021-10-18 | End: 2021-11-17

## 2021-12-15 DIAGNOSIS — M54.50 CHRONIC BILATERAL LOW BACK PAIN WITHOUT SCIATICA: Primary | ICD-10-CM

## 2021-12-15 DIAGNOSIS — G89.29 CHRONIC BILATERAL LOW BACK PAIN WITHOUT SCIATICA: Primary | ICD-10-CM

## 2021-12-15 RX ORDER — TRAMADOL HYDROCHLORIDE 50 MG/1
TABLET ORAL
Qty: 60 TABLET | Refills: 2 | Status: SHIPPED | OUTPATIENT
Start: 2021-12-15 | End: 2022-03-15

## 2022-01-20 ENCOUNTER — TELEPHONE (OUTPATIENT)
Dept: UROGYNECOLOGY | Age: 72
End: 2022-01-20

## 2022-01-20 NOTE — TELEPHONE ENCOUNTER
There is a referral for pt from  to be seen for problems of emptying bladder. Called and left a message 1/20 to schedule an appt.

## 2022-02-28 ENCOUNTER — PATIENT MESSAGE (OUTPATIENT)
Dept: INTERNAL MEDICINE CLINIC | Age: 72
End: 2022-02-28

## 2022-02-28 NOTE — TELEPHONE ENCOUNTER
From: Mya Martinez  To: Dr. Ayon Fears: 2/28/2022 12:44 PM EST  Subject: Blood work     I have an appointment on Wednesday at 11:15. Would it be possible to get my routine blood work done before my appointment? If so, what time should I arrive? And can I drink black coffee that morning or just water?  Thanks, Nitesh

## 2022-03-02 ENCOUNTER — OFFICE VISIT (OUTPATIENT)
Dept: INTERNAL MEDICINE CLINIC | Age: 72
End: 2022-03-02
Payer: MEDICARE

## 2022-03-02 VITALS
HEART RATE: 71 BPM | BODY MASS INDEX: 17.56 KG/M2 | DIASTOLIC BLOOD PRESSURE: 72 MMHG | RESPIRATION RATE: 12 BRPM | WEIGHT: 93 LBS | SYSTOLIC BLOOD PRESSURE: 124 MMHG | OXYGEN SATURATION: 95 % | HEIGHT: 61 IN

## 2022-03-02 DIAGNOSIS — R73.01 IMPAIRED FASTING GLUCOSE: ICD-10-CM

## 2022-03-02 DIAGNOSIS — M41.9 SCOLIOSIS OF THORACOLUMBAR SPINE, UNSPECIFIED SCOLIOSIS TYPE: ICD-10-CM

## 2022-03-02 DIAGNOSIS — M54.50 CHRONIC BILATERAL LOW BACK PAIN WITHOUT SCIATICA: Primary | ICD-10-CM

## 2022-03-02 DIAGNOSIS — G25.81 RLS (RESTLESS LEGS SYNDROME): ICD-10-CM

## 2022-03-02 DIAGNOSIS — G89.29 CHRONIC BILATERAL LOW BACK PAIN WITHOUT SCIATICA: Primary | ICD-10-CM

## 2022-03-02 DIAGNOSIS — E78.5 HYPERLIPIDEMIA, UNSPECIFIED HYPERLIPIDEMIA TYPE: ICD-10-CM

## 2022-03-02 DIAGNOSIS — F40.243 ANXIETY WITH FLYING: ICD-10-CM

## 2022-03-02 PROCEDURE — 99214 OFFICE O/P EST MOD 30 MIN: CPT | Performed by: INTERNAL MEDICINE

## 2022-03-02 RX ORDER — ALPRAZOLAM 0.25 MG/1
TABLET ORAL
Qty: 20 TABLET | Refills: 0 | Status: SHIPPED | OUTPATIENT
Start: 2022-03-02 | End: 2022-08-30 | Stop reason: SDUPTHER

## 2022-03-02 NOTE — PROGRESS NOTES
[facility]) Physicians  Internal Medicine  Patient Encounter  Alvaro Smith D.O., [facility]        Chief Complaint   Patient presents with   Hipolito Lynn    Medication Check       HPI: 73 y.o. female with multiple medical issues seen today requesting her routine checkup regarding the status of current chronic medical problems listed below along with a medication review and reconciliation. She saw Dr. Jenni Peabody for OB/Gyn. Pt with protrusion-- cystocele. She was referred to Dr. Naveed Harper. Chronic low back pain-- Previous history ----> Pain generators include Lumbar facet arthropathy. Interval Hx---->  This medication continues to be beneficial helping with low back pain and RLS at night. No adverse effects. RLS--Well controlled. She states the Tramadol has always been effective. Hyperlipidemia:    Lab Results   Component Value Date    LDLCALC 121 (H) 10/12/2020   Patient remains compliant with pravastatin 20 mg nightly. She denies new myalgias or cramping. She has a strong family history of CAD. She has never had problems with ASA. Patient stays very active. HDL is high which should be protective. Osteoporosis--patient has seen Dr. Juani Hoang, endocrinology. Prolia was recommended. She has declined therapy and further eval.     Situational anxiety--She uses the Xanax when flying. She has a trip planned for April. She will also travel in September. The Xanax works well for flying anxiety. She gets claustrophobic.           Past Medical History:   Diagnosis Date    Arthropathy of cervical facet joint     Cervical spondylosis     Chronic bilateral low back pain without sciatica 11/28/2016    DJD (degenerative joint disease), cervical 10/13/2010    DJD (degenerative joint disease), lumbar 9/10/2010    Heart murmur 7/21/2020    Hyperlipidemia     Impaired fasting glucose 6/12/2018    Lumbar facet arthropathy     Mild mitral valve prolapse     MVP (mitral valve prolapse) 7/21/2020    Osteopenia     PONV (postoperative nausea and vomiting)     RLS (restless legs syndrome)     Scoliosis     Spondylolisthesis        Review of Systems - As per HPI      OBJECTIVE:  Vitals:    03/02/22 1118   BP: 124/72   Pulse: 71   Resp: 12   SpO2: 95%   Weight: 93 lb (42.2 kg)   Height: 5' 1\" (1.549 m)     Body mass index is 17.57 kg/m². Wt Readings from Last 3 Encounters:   03/02/22 93 lb (42.2 kg)   08/31/21 92 lb (41.7 kg)   01/25/21 94 lb (42.6 kg)     BP Readings from Last 3 Encounters:   03/02/22 124/72   08/31/21 126/78   01/25/21 120/80      GEN: NAD, A&O, Non-toxic  HEENT: NC/AT, SUZE, EOMI, anicteric  NECK: Supple. No thyromegaly. No JVD. LYMPH: No C/SC nodes. CV: Regular rhythm. Rate normal.  1/6 systolic murmur-no worse  PULM: CTA  EXT: No edema. GI: Abdomen is soft, NT  NEURO: No focal or lateralizing deficits. No ataxia. Moves all extremities symmetrically. VASC:  No carotid bruits. Pulses symmetric  MS: Increased soft tissue tone but no tenderness of the thoracic and lumbar paraspinals. She has a significant thoracolumbar scoliosis with most of the convexity in the lumbar spine. Convexity to the left side bent right      ASSESSMENT/PLAN:    1. Chronic bilateral low back pain without sciatica  Condition is well controlled  Continue tramadol for pain management  Continue to monitor for exacerbation    2. Scoliosis of thoracolumbar spine, unspecified scoliosis type  No changes  Continue to monitor for worsening pain and neurologic symptoms    3. Anxiety with flying  Will refill Xanax  We reviewed the potential adverse side effects and habit-forming nature of the medication    4. RLS (restless legs syndrome)  Well-controlled. Surprisingly the tramadol works very well at night for this    5. Hyperlipidemia, unspecified hyperlipidemia type  Condition stability and control are uncertain  Lab pending    6.  Impaired fasting glucose  Condition stability is uncertain  Lab pending  Continue to focus on Lifestyle modification including low calorie diet focusing on Low fat/low cholesterol and low carbohydrate intake, along with  increasing cardiovascular (aerobic) exercise. Discussed medications with patient who voiced understanding of their use and indication. All questions answered. This note was generated completely or in part utilizing Dragon dictation speech recognition software. Occasionally, words are mistranscribed and despite editing, the text may contain inaccuracies due to incorrect word recognition.   If further clarification is needed please contact the office at [phone]

## 2022-03-30 ENCOUNTER — OFFICE VISIT (OUTPATIENT)
Dept: UROGYNECOLOGY | Age: 72
End: 2022-03-30
Payer: MEDICARE

## 2022-03-30 VITALS
RESPIRATION RATE: 16 BRPM | HEART RATE: 80 BPM | TEMPERATURE: 98 F | OXYGEN SATURATION: 99 % | DIASTOLIC BLOOD PRESSURE: 75 MMHG | SYSTOLIC BLOOD PRESSURE: 175 MMHG

## 2022-03-30 DIAGNOSIS — N81.9 VAGINAL VAULT PROLAPSE: ICD-10-CM

## 2022-03-30 DIAGNOSIS — R33.9 INCOMPLETE BLADDER EMPTYING: ICD-10-CM

## 2022-03-30 DIAGNOSIS — N81.11 CYSTOCELE, MIDLINE: Primary | ICD-10-CM

## 2022-03-30 DIAGNOSIS — N95.2 VAGINAL ATROPHY: ICD-10-CM

## 2022-03-30 DIAGNOSIS — R39.15 URINARY URGENCY: ICD-10-CM

## 2022-03-30 LAB
BILIRUBIN, POC: NORMAL
BLOOD URINE, POC: NORMAL
CLARITY, POC: CLEAR
COLOR, POC: YELLOW
EMPTY COUGH STRESS TEST: NORMAL
FIRST SENSATION: 50 CC
FULL COUGH STRESS TEST: NORMAL
GLUCOSE URINE, POC: NORMAL
KETONES, POC: NORMAL
LEUKOCYTE EST, POC: NORMAL
MAX SENSATION: 225 CC
NITRATE, URINE POC: NORMAL
NITRITE, POC: NORMAL
PH, POC: 6.5
POST VOID RESIDUAL (PVR): 80 ML
PROTEIN, POC: NORMAL
RBC URINE, POC: NORMAL
SECOND SENSATION: 100 CC
SPASM: NORMAL
SPECIFIC GRAVITY, POC: 1.01
UROBILINOGEN, POC: NORMAL
WBC URINE, POC: NORMAL

## 2022-03-30 PROCEDURE — 51725 SIMPLE CYSTOMETROGRAM: CPT | Performed by: OBSTETRICS & GYNECOLOGY

## 2022-03-30 PROCEDURE — 99204 OFFICE O/P NEW MOD 45 MIN: CPT | Performed by: OBSTETRICS & GYNECOLOGY

## 2022-03-30 PROCEDURE — 81002 URINALYSIS NONAUTO W/O SCOPE: CPT | Performed by: OBSTETRICS & GYNECOLOGY

## 2022-03-30 RX ORDER — ESTRADIOL 0.1 MG/G
0.25 CREAM VAGINAL DAILY
Qty: 30 G | Refills: 0 | Status: SHIPPED | OUTPATIENT
Start: 2022-03-30 | End: 2022-09-02

## 2022-03-30 RX ORDER — TRAMADOL HYDROCHLORIDE 50 MG/1
50 TABLET ORAL NIGHTLY
COMMUNITY
End: 2022-05-24

## 2022-03-30 NOTE — PROGRESS NOTES
3/30/2022      HPI:     Name: Yari Foley  YOB: 1950    CC: Patient is a 73 y.o. female who is seen in consultation from Martha Angulo MD   for evaluation of vaginal prottrusion. HPI:  Bladder control problem: No  Bladder emptying problems:  Yes   How long have you had bladder emptying problems? 3 months  Do you notice any dribbling of urine when you stand after passing urine? No  Do you usually have difficulty starting your urine stream? Yes  Do you have to assume abnormal positions to urinate? No   Do you have to strain to empty your bladder? No  Do you feel as if your bladder is empty after passing urine? No  Is your urine flow: weak    Prolapse/Vaginal Support problems: Yes   Do you notice a bulge? Yes  How long have you had a protrusion or bulge? 3 months  Are your symptoms worse at the end of the day or after for prolonged periods? Yes  Do you push the protrusion back to help with a bowel movement or to empty your bladder? No  Have you ever used a pessary (plastic support device) for this problem? No      Bowel problem(s): No   Sexual History:  reports being sexually active and has had partner(s) who are male.   Pelvic Pain:  No  Ob/Gyn History:    OB History    Para Term  AB Living   3 3 3     3   SAB IAB Ectopic Molar Multiple Live Births             3      # Outcome Date GA Lbr Dheeraj/2nd Weight Sex Delivery Anes PTL Lv   3 Term      Vag-Spont   ELIU   2 Term 1     Vag-Spont   ELIU   1 Term 12     Vag-Spont   ELIU     Past Medical History:   Past Medical History:   Diagnosis Date    Arthropathy of cervical facet joint     Cervical spondylosis     Chronic bilateral low back pain without sciatica 2016    DJD (degenerative joint disease), cervical 10/13/2010    DJD (degenerative joint disease), lumbar 9/10/2010    Heart murmur 2020    Hyperlipidemia     Impaired fasting glucose 2018    Lumbar facet arthropathy     Mild mitral valve prolapse  MVP (mitral valve prolapse) 7/21/2020    Osteopenia     PONV (postoperative nausea and vomiting)     RLS (restless legs syndrome)     Scoliosis     Spondylolisthesis      Past Surgical History:   Past Surgical History:   Procedure Laterality Date    CYSTOSCOPY      HYSTERECTOMY, TOTAL ABDOMINAL  1998    ROTATOR CUFF REPAIR Right 2008     Allergies: No Known Allergies  Current Medications:  Current Outpatient Medications   Medication Sig Dispense Refill    traMADol (ULTRAM) 50 MG tablet Take 50 mg by mouth nightly.  estradiol (ESTRACE VAGINAL) 0.1 MG/GM vaginal cream Place 0.25 g vaginally daily Apply 0.25g with fingertip to the vaginal opening every night at bedtime for 2 weeks, then decrease to twice weekly. 30 g 0    pravastatin (PRAVACHOL) 20 MG tablet TAKE ONE TABLET BY MOUTH ONCE NIGHTLY 90 tablet 3    Ascorbic Acid (VITAMIN C) 500 MG CHEW Take by mouth daily 60 tablet 0    Multiple Minerals-Vitamins (CALCIUM & VIT D3 BONE HEALTH) [email or web address] brand vitamin. Calcium/vitamin D3 twice daily      aspirin EC 81 MG EC tablet Take 1 tablet by mouth daily 30 tablet 5    ALPRAZolam (XANAX) 0.25 MG tablet Take 1 tab 30-45 minutes before flight (Patient not taking: Reported on 3/30/2022) 20 tablet 0     No current facility-administered medications for this visit. Social History:   Social History     Socioeconomic History    Marital status:      Spouse name: Not on file    Number of children: Not on file    Years of education: Not on file    Highest education level: Not on file   Occupational History    Not on file   Tobacco Use    Smoking status: Never Smoker    Smokeless tobacco: Never Used   Vaping Use    Vaping Use: Never used   Substance and Sexual Activity    Alcohol use:  Yes     Alcohol/week: 2.0 standard drinks     Types: 2 Glasses of wine per week    Drug use: No    Sexual activity: Yes     Partners: Male     Comment:    Other Topics Concern    Not on file Social History Narrative    Not on file     Social Determinants of Health     Financial Resource Strain:     Difficulty of Paying Living Expenses: Not on file   Food Insecurity:     Worried About Running Out of Food in the Last Year: Not on file    Jack of Food in the Last Year: Not on file   Transportation Needs:     Lack of Transportation (Medical): Not on file    Lack of Transportation (Non-Medical): Not on file   Physical Activity:     Days of Exercise per Week: Not on file    Minutes of Exercise per Session: Not on file   Stress:     Feeling of Stress : Not on file   Social Connections:     Frequency of Communication with Friends and Family: Not on file    Frequency of Social Gatherings with Friends and Family: Not on file    Attends [employer] Services: Not on file    Active Member of [address] [place] [address] [place] or Organizations: Not on file    Attends Club or Organization Meetings: Not on file    Marital Status: Not on file   Intimate Partner Violence:     Fear of Current or Ex-Partner: Not on file    Emotionally Abused: Not on file    Physically Abused: Not on file    Sexually Abused: Not on file   Housing Stability:     Unable to Pay for Housing in the Last Year: Not on file    Number of [place] in the Last Year: Not on file    Unstable Housing in the Last Year: Not on file     Family History:   Family History   Problem Relation Age of Onset    Diabetes Mother     Heart Disease Mother 67        CAD, CABG    Heart Disease Father 37        CAD,CABG, Stents x 2    Diabetes Brother     Heart Disease Brother 76        CAD, Arrhythmia    Diabetes Maternal Grandmother      Review of System  Review of Systems    A review of systems was done by the patient and reviewed by the provider. Objective:     Vital Signs  Vitals:    03/30/22 1242   BP: (!) 175/75   Pulse: 80   Resp: 16   Temp: 98 °F (36.7 °C)   SpO2: 99%     Physical Exam  Physical Exam  HENT:      Head: Normocephalic and atraumatic. Eyes:      Conjunctiva/sclera: Conjunctivae normal.   Pulmonary:      Effort: Pulmonary effort is normal.   Abdominal:      Palpations: Abdomen is soft. Genitourinary:     Comments: Vaginal atrophy, cystocele, vault prolapse, rectocele  Musculoskeletal:      Cervical back: Normal range of motion and neck supple. Skin:     General: Skin is warm and dry. Neurological:      Mental Status: She is alert and oriented to person, place, and time. Office Fill Study/Urine Dip:     Using sterile technique a manometry catheter was placed. Patient's bladder was filled with sterile water by gravity. Capacity and storage volumes were measured. Spasms assessed. Catheter was removed. Stress urinary incontinence was assessed.     Results for POC orders placed in visit on 03/30/22   POCT Urinalysis no Micro   Result Value Ref Range    Color, UA yellow     Clarity, UA clear     Glucose, UA POC neg     Bilirubin, UA neg     Ketones, UA neg     Spec Grav, UA 1.015     Blood, UA POC neg     pH, UA 6.5     Protein, UA POC neg     Urobilinogen, UA neg     Leukocytes, UA neg     Nitrite, UA neg        Cystometrogram   wbc urine, poc   Date Value Ref Range Status   03/30/2022 neg  Final     rbc urine, poc   Date Value Ref Range Status   03/30/2022 neg  Final     Nitrate, UA POC   Date Value Ref Range Status   03/30/2022 neg  Final     post void residual   Date Value Ref Range Status   03/30/2022 80 ml Final     FIRST SENSATION   Date Value Ref Range Status   03/30/2022 50 cc Final     SECOND SENSATION   Date Value Ref Range Status   03/30/2022 100 cc Final     MAX SENSATION   Date Value Ref Range Status   03/30/2022 225 cc Final     EMPTY COUGH STRESS TEST   Date Value Ref Range Status   03/30/2022 neg  Final     FULL COUGH STRESS TEST   Date Value Ref Range Status   03/30/2022 neg  Final     SPASM   Date Value Ref Range Status   03/30/2022 neg  Final        POPQ and Pelvic Exam:     Pelvic Organ Prolapse Quantification  Anterior Wall (Aa): +1   Anterior Wall (Ba): +1   Cervix or Cuff (C): -2     Genital Hiatus (gh): 2   Perineal Body (pb): 3   Total Vaginal Length (tvl): 7     Posterior Wall (Ap): -1   Posterior Wall (Bp): -1   No data recorded   Oxford Scale Muscle Strength: 0       Assessment/Plan:     Lidia Helm is a 73 y.o. female with   1. Cystocele, midline    2. Urinary urgency    3. Vaginal atrophy    4. Vaginal vault prolapse    5. Incomplete bladder emptying    Old records reviewed, outside records reviewed, cystometrogram was reviewed    Chidi Dallas presents today with a chief complaint of pelvic organ prolapse. She also has some urgency frequency and incomplete bladder emptying. She had a hysterectomy in 1999 done via the abdomen with her ovaries removed. According to the patient she had TAN exposure as a child. I did draw a picture of her today and gave her handouts on her condition. She is an avid hiker and has plans to go to [facility] in the fall. She will follow-up for a cystourethroscopy and surgical consultation. I did give her estrogen vaginal cream for her vaginal atrophy    Orders Placed This Encounter   Procedures    POCT Urinalysis no Micro    Cystometrogram     Orders Placed This Encounter   Medications    estradiol (ESTRACE VAGINAL) 0.1 MG/GM vaginal cream     Sig: Place 0.25 g vaginally daily Apply 0.25g with fingertip to the vaginal opening every night at bedtime for 2 weeks, then decrease to twice weekly.      Dispense:  30 g     Refill:  Shireen Hermosillo MD

## 2022-04-07 ENCOUNTER — PROCEDURE VISIT (OUTPATIENT)
Dept: UROGYNECOLOGY | Age: 72
End: 2022-04-07
Payer: MEDICARE

## 2022-04-07 VITALS
DIASTOLIC BLOOD PRESSURE: 75 MMHG | OXYGEN SATURATION: 98 % | TEMPERATURE: 98 F | SYSTOLIC BLOOD PRESSURE: 147 MMHG | HEART RATE: 78 BPM | RESPIRATION RATE: 14 BRPM

## 2022-04-07 DIAGNOSIS — N81.11 CYSTOCELE, MIDLINE: Primary | ICD-10-CM

## 2022-04-07 DIAGNOSIS — N81.9 VAGINAL VAULT PROLAPSE: ICD-10-CM

## 2022-04-07 DIAGNOSIS — R39.15 URINARY URGENCY: ICD-10-CM

## 2022-04-07 DIAGNOSIS — N95.2 VAGINAL ATROPHY: ICD-10-CM

## 2022-04-07 DIAGNOSIS — R33.9 INCOMPLETE BLADDER EMPTYING: ICD-10-CM

## 2022-04-07 PROCEDURE — 99214 OFFICE O/P EST MOD 30 MIN: CPT | Performed by: OBSTETRICS & GYNECOLOGY

## 2022-04-07 PROCEDURE — 52285 CYSTOSCOPY AND TREATMENT: CPT | Performed by: OBSTETRICS & GYNECOLOGY

## 2022-04-07 RX ORDER — SODIUM CHLORIDE 9 MG/ML
25 INJECTION, SOLUTION INTRAVENOUS PRN
Status: CANCELLED | OUTPATIENT
Start: 2022-04-07

## 2022-04-07 RX ORDER — SODIUM CHLORIDE 0.9 % (FLUSH) 0.9 %
5-40 SYRINGE (ML) INJECTION EVERY 12 HOURS SCHEDULED
Status: CANCELLED | OUTPATIENT
Start: 2022-04-07

## 2022-04-07 RX ORDER — SODIUM CHLORIDE 0.9 % (FLUSH) 0.9 %
5-40 SYRINGE (ML) INJECTION PRN
Status: CANCELLED | OUTPATIENT
Start: 2022-04-07

## 2022-04-07 ASSESSMENT — ENCOUNTER SYMPTOMS: BACK PAIN: 1

## 2022-04-07 NOTE — PROGRESS NOTES
2022     HPI:     Name: Angelina Shultz  YOB: 1950    CC: Angelina Shultz is a 73 y.o. female presenting for an evaluation of prolapse. HPI: How long have you had this problem? Several years  Please rate the severity of your problem: moderate  Anything make it better? No changes since last visit    Ob/Gyn History:    OB History    Para Term  AB Living   3 3 3     3   SAB IAB Ectopic Molar Multiple Live Births             3      # Outcome Date GA Lbr Dheeraj/2nd Weight Sex Delivery Anes PTL Lv   3 Term      Vag-Spont   ELIU   2 Term 1     Vag-Spont   ELIU   1 Term 12     Vag-Spont   ELIU     Past Medical History:   Past Medical History:   Diagnosis Date    Arthropathy of cervical facet joint     Cervical spondylosis     Chronic bilateral low back pain without sciatica 2016    DJD (degenerative joint disease), cervical 10/13/2010    DJD (degenerative joint disease), lumbar 9/10/2010    Heart murmur 2020    Hyperlipidemia     Impaired fasting glucose 2018    Lumbar facet arthropathy     Mild mitral valve prolapse     MVP (mitral valve prolapse) 2020    Osteopenia     PONV (postoperative nausea and vomiting)     RLS (restless legs syndrome)     Scoliosis     Spondylolisthesis      Past Surgical History:   Past Surgical History:   Procedure Laterality Date    CYSTOSCOPY      HYSTERECTOMY, TOTAL ABDOMINAL  1998    ROTATOR CUFF REPAIR Right 2008     Current Medications:  Current Outpatient Medications   Medication Sig Dispense Refill    traMADol (ULTRAM) 50 MG tablet Take 50 mg by mouth nightly.       ALPRAZolam (XANAX) 0.25 MG tablet Take 1 tab 30-45 minutes before flight 20 tablet 0    pravastatin (PRAVACHOL) 20 MG tablet TAKE ONE TABLET BY MOUTH ONCE NIGHTLY 90 tablet 3    Ascorbic Acid (VITAMIN C) 500 MG CHEW Take by mouth daily 60 tablet 0    Multiple Minerals-Vitamins (CALCIUM & VIT D3 BONE HEALTH) [employer] brand vitamin. Calcium/vitamin D3 twice daily      aspirin EC 81 MG EC tablet Take 1 tablet by mouth daily 30 tablet 5    estradiol (ESTRACE VAGINAL) 0.1 MG/GM vaginal cream Place 0.25 g vaginally daily Apply 0.25g with fingertip to the vaginal opening every night at bedtime for 2 weeks, then decrease to twice weekly. (Patient not taking: Reported on 4/7/2022) 30 g 0     No current facility-administered medications for this visit. Allergies: No Known Allergies  Social History:   Social History     Socioeconomic History    Marital status:      Spouse name: Not on file    Number of children: Not on file    Years of education: Not on file    Highest education level: Not on file   Occupational History    Not on file   Tobacco Use    Smoking status: Never Smoker    Smokeless tobacco: Never Used   Vaping Use    Vaping Use: Never used   Substance and Sexual Activity    Alcohol use: Yes     Alcohol/week: 2.0 standard drinks     Types: 2 Glasses of wine per week    Drug use: No    Sexual activity: Yes     Partners: Male     Comment:    Other Topics Concern    Not on file   Social History Narrative    Not on file     Social Determinants of Health     Financial Resource Strain:     Difficulty of Paying Living Expenses: Not on file   Food Insecurity:     Worried About Running Out of Food in the Last Year: Not on file    Jack of Food in the Last Year: Not on file   Transportation Needs:     Lack of Transportation (Medical): Not on file    Lack of Transportation (Non-Medical):  Not on file   Physical Activity:     Days of Exercise per Week: Not on file    Minutes of Exercise per Session: Not on file   Stress:     Feeling of Stress : Not on file   Social Connections:     Frequency of Communication with Friends and Family: Not on file    Frequency of Social Gatherings with Friends and Family: Not on file    Attends [employer] Services: Not on file    Active Member of Clubs or Organizations: Not on file    Attends Club or Organization Meetings: Not on file    Marital Status: Not on file   Intimate Partner Violence:     Fear of Current or Ex-Partner: Not on file    Emotionally Abused: Not on file    Physically Abused: Not on file    Sexually Abused: Not on file   Housing Stability:     Unable to Pay for Housing in the Last Year: Not on file    Number of [place] in the Last Year: Not on file    Unstable Housing in the Last Year: Not on file     Family History:   Family History   Problem Relation Age of Onset    Diabetes Mother     Heart Disease Mother 67        CAD, CABG    Heart Disease Father 37        CAD,CABG, Stents x 2    Diabetes Brother     Heart Disease Brother 76        CAD, Arrhythmia    Diabetes Maternal Grandmother      Review of System  Review of Systems   All other systems reviewed and are negative. A review of systems was done by the patient and reviewed by me. Objective:     Vital Signs  Vitals:    04/07/22 1310   BP: (!) 147/75   Pulse: 78   Resp: 14   Temp: 98 °F (36.7 °C)   SpO2: 98%      Physical Exam  Physical Exam  HENT:      Head: Normocephalic and atraumatic. Eyes:      Conjunctiva/sclera: Conjunctivae normal.   Pulmonary:      Effort: Pulmonary effort is normal.   Abdominal:      Palpations: Abdomen is soft. Genitourinary:     Comments: Cystocele, rectocele, vault prolapse  Musculoskeletal:      Cervical back: Normal range of motion and neck supple. Skin:     General: Skin is warm and dry. Neurological:      Mental Status: She is alert and oriented to person, place, and time. Procedure: The urethral was anesthesized with topical lidocaine jelly and dilated to a #14 Swazi. A 0-degree urethroscope was used to examine the urethra. A 70-degree cystoscope was used to evaluate the bladder. FINDINGS:  1. Urethra was normal  2. Bladder had normal  3. Trigone appeared Normal  4. Ureters illustrated bilateral efflux  5.  Patient exhibited spasms during the study no    Cough stress test: Bladder filled to 200 mL. Patient did not leak with cough stress test.    No results found for this visit on 04/07/22. Assessment/Plan:     Sydnee Frank is a 73 y.o. female with   1. Cystocele, midline    2. Urinary urgency    3. Vaginal atrophy    4. Vaginal vault prolapse    5. Incomplete bladder emptying    Old records reviewed, outside records reviewed, cystoscopy was reviewed  The patient was counseled on surgical and non-surgical options. The patient elected to move forward with surgery. The risks and benefits of surgery including but not limited to bleeding, infection, injury to bowel, bladder, ureter, or other internal organs, transfusion, pain, bowel dysfunction, urinary incontinence, and sexual dysfunction were discussed at length. All questions were answered to the patients satisfaction. The patient elected to undergo posterior repair, enterocele repair, robotic sacral colpopexy and cystourethroscopy. The risk and benefits of synthetic material, including mesh, were explained to the patient and all questions were answered. Preop labs ordered  Orders Placed This Encounter   Procedures    Initiate PAT Protocol     Standing Status:   Future     Standing Expiration Date:   6/6/2022     No orders of the defined types were placed in this encounter.       Jean Paul Cortez MD

## 2022-04-07 NOTE — LETTER
[address]  Urogynecology  [address] [facility]  Phone: [phone]  Fax: [phone]    Ginna Santos MD    April 7, 2022     [address] [place] [address]    Patient: Ritchie Schilling   MR Number: [id number]   YOB: 1950   Date of Visit: 4/7/2022       Dear [address] [place] [address]:    Thank you for referring Quinton Joshua to me for evaluation/treatment. Below are the relevant portions of my assessment and plan of care. If you have questions, please do not hesitate to call me. I look forward to following Owen Miles along with you.     Sincerely,      Ginna Santos MD

## 2022-04-07 NOTE — PROGRESS NOTES
2022     HPI:     Name: Daljit Hollingsworth  YOB: 1950    CC: Daljit Hollingsworth is a 73 y.o. female presenting for an evaluation of prolapse. HPI: How long have you had this problem? months  Please rate the severity of your problem: moderate  Anything make it better? Not using estrogen cream    Ob/Gyn History:    OB History    Para Term  AB Living   3 3 3     3   SAB IAB Ectopic Molar Multiple Live Births             3      # Outcome Date GA Lbr Dheeraj/2nd Weight Sex Delivery Anes PTL Lv   3 Term      Vag-Spont   ELIU   2 Term 1     Vag-Spont   ELIU   1 Term 12     Vag-Spont   ELIU     Past Medical History:   Past Medical History:   Diagnosis Date    Arthropathy of cervical facet joint     Cervical spondylosis     Chronic bilateral low back pain without sciatica 2016    DJD (degenerative joint disease), cervical 10/13/2010    DJD (degenerative joint disease), lumbar 9/10/2010    Heart murmur 2020    Hyperlipidemia     Impaired fasting glucose 2018    Lumbar facet arthropathy     Mild mitral valve prolapse     MVP (mitral valve prolapse) 2020    Osteopenia     PONV (postoperative nausea and vomiting)     RLS (restless legs syndrome)     Scoliosis     Spondylolisthesis      Past Surgical History:   Past Surgical History:   Procedure Laterality Date    CYSTOSCOPY      HYSTERECTOMY, TOTAL ABDOMINAL  1998    ROTATOR CUFF REPAIR Right      Current Medications:  Current Outpatient Medications   Medication Sig Dispense Refill    traMADol (ULTRAM) 50 MG tablet Take 50 mg by mouth nightly.  estradiol (ESTRACE VAGINAL) 0.1 MG/GM vaginal cream Place 0.25 g vaginally daily Apply 0.25g with fingertip to the vaginal opening every night at bedtime for 2 weeks, then decrease to twice weekly.  30 g 0    ALPRAZolam (XANAX) 0.25 MG tablet Take 1 tab 30-45 minutes before flight (Patient not taking: Reported on 3/30/2022) 20 tablet 0    pravastatin (PRAVACHOL) 20 MG tablet TAKE ONE TABLET BY MOUTH ONCE NIGHTLY 90 tablet 3    Ascorbic Acid (VITAMIN C) 500 MG CHEW Take by mouth daily 60 tablet 0    Multiple Minerals-Vitamins (CALCIUM & VIT D3 BONE HEALTH) [employer] brand vitamin. Calcium/vitamin D3 twice daily      aspirin EC 81 MG EC tablet Take 1 tablet by mouth daily 30 tablet 5     No current facility-administered medications for this visit. Allergies: No Known Allergies  Social History:   Social History     Socioeconomic History    Marital status:      Spouse name: Not on file    Number of children: Not on file    Years of education: Not on file    Highest education level: Not on file   Occupational History    Not on file   Tobacco Use    Smoking status: Never Smoker    Smokeless tobacco: Never Used   Vaping Use    Vaping Use: Never used   Substance and Sexual Activity    Alcohol use: Yes     Alcohol/week: 2.0 standard drinks     Types: 2 Glasses of wine per week    Drug use: No    Sexual activity: Yes     Partners: Male     Comment:    Other Topics Concern    Not on file   Social History Narrative    Not on file     Social Determinants of Health     Financial Resource Strain:     Difficulty of Paying Living Expenses: Not on file   Food Insecurity:     Worried About Running Out of Food in the Last Year: Not on file    Jack of Food in the Last Year: Not on file   Transportation Needs:     Lack of Transportation (Medical): Not on file    Lack of Transportation (Non-Medical):  Not on file   Physical Activity:     Days of Exercise per Week: Not on file    Minutes of Exercise per Session: Not on file   Stress:     Feeling of Stress : Not on file   Social Connections:     Frequency of Communication with Friends and Family: Not on file    Frequency of Social Gatherings with Friends and Family: Not on file    Attends [employer]regation Services: Not on file    Active Member of Clubs or Organizations: Not on file   Haider Loser Attends Club or Organization Meetings: Not on file    Marital Status: Not on file   Intimate Partner Violence:     Fear of Current or Ex-Partner: Not on file    Emotionally Abused: Not on file    Physically Abused: Not on file    Sexually Abused: Not on file   Housing Stability:     Unable to Pay for Housing in the Last Year: Not on file    Number of [place] in the Last Year: Not on file    Unstable Housing in the Last Year: Not on file     Family History:   Family History   Problem Relation Age of Onset    Diabetes Mother     Heart Disease Mother 67        CAD, CABG    Heart Disease Father 37        CAD,CABG, Stents x 2    Diabetes Brother     Heart Disease Brother 76        CAD, Arrhythmia    Diabetes Maternal Grandmother      Review of System  Review of Systems   Genitourinary: Positive for dysuria. Musculoskeletal: Positive for back pain. Allergic/Immunologic: Positive for environmental allergies. All other systems reviewed and are negative. A review of systems was done by the patient and reviewed by me. Objective:     Vital Signs  There were no vitals filed for this visit. Physical Exam  Physical Exam    Procedure: The urethral was anesthesized with topical lidocaine jelly and dilated to a #14 Hebrew. A 0-degree urethroscope was used to examine the urethra. A 70-degree cystoscope was used to evaluate the bladder. FINDINGS:  1. Urethra was {Norm Abnorm Urogyn:41933}  2. Bladder had {Bladder Urogyn:43080}  3. Trigone appeared {Trigone Urogyn:19112}  4. Ureters illustrated {Ureters Urogyn:18033} efflux  5. Patient exhibited spasms during the study {yes/no:466672}    Cough stress test: Bladder filled to *** mL. Patient {Desc; did/not:93872} leak with cough stress test.    No results found for this visit on 04/07/22. Assessment/Plan:     Jason Pisano is a 73 y.o. female with No diagnosis found. No orders of the defined types were placed in this encounter.     No orders of the defined types were placed in this encounter.       Destinee Lay RN

## 2022-05-22 DIAGNOSIS — G25.81 RLS (RESTLESS LEGS SYNDROME): ICD-10-CM

## 2022-05-22 DIAGNOSIS — M54.50 CHRONIC BILATERAL LOW BACK PAIN WITHOUT SCIATICA: Primary | ICD-10-CM

## 2022-05-22 DIAGNOSIS — G89.29 CHRONIC BILATERAL LOW BACK PAIN WITHOUT SCIATICA: Primary | ICD-10-CM

## 2022-05-24 RX ORDER — TRAMADOL HYDROCHLORIDE 50 MG/1
TABLET ORAL
Qty: 60 TABLET | Refills: 2 | Status: SHIPPED | OUTPATIENT
Start: 2022-05-24 | End: 2022-08-22

## 2022-06-10 ENCOUNTER — TELEPHONE (OUTPATIENT)
Dept: INTERNAL MEDICINE CLINIC | Age: 72
End: 2022-06-10

## 2022-06-10 NOTE — TELEPHONE ENCOUNTER
Pt received a final notice/collection agency referral in the mail, regarding lab bill. The statement date is 5/26/22, with a balance of $31.24. Patient has provided us with a copy of that statement along with a copy of her bank statement showing that she paid $31.24 on 2/25/2022. Forwarded information to lab billing. Moriah Perkins has forwarded this on to our 3rd party billing company to correct.  Patient advised

## 2022-07-15 ENCOUNTER — PATIENT MESSAGE (OUTPATIENT)
Dept: INTERNAL MEDICINE CLINIC | Age: 72
End: 2022-07-15

## 2022-07-15 NOTE — TELEPHONE ENCOUNTER
From: Hollie Mustafa  To: Dr. Reese Began: 7/15/2022 3:59 PM EDT  Subject: Covid Booster    For your records, Monica Lyric and I received our 2nd Covid Booster today. Have a good weekend.   Mohinder Taylor

## 2022-08-30 ENCOUNTER — PATIENT MESSAGE (OUTPATIENT)
Dept: INTERNAL MEDICINE CLINIC | Age: 72
End: 2022-08-30

## 2022-08-30 DIAGNOSIS — F40.243 ANXIETY WITH FLYING: ICD-10-CM

## 2022-08-30 RX ORDER — PRAVASTATIN SODIUM 20 MG
TABLET ORAL
Qty: 90 TABLET | Refills: 3 | Status: SHIPPED | OUTPATIENT
Start: 2022-08-30

## 2022-08-30 RX ORDER — ALPRAZOLAM 0.25 MG/1
TABLET ORAL
Qty: 20 TABLET | Refills: 0 | Status: SHIPPED | OUTPATIENT
Start: 2022-08-30 | End: 2022-11-27

## 2022-08-30 NOTE — TELEPHONE ENCOUNTER
From: Sanjay Carter  To: Dr. Vickie Flanagan: 2022 10:50 AM EDT  Subject: Xanax refill     We will be flying to [place] next week and taking 12-night cruise. I have enough Xanax from previous trip that have not  to cover most of the flying. If its possible, I would like a few more to take the first couple of nights that Im there. I did this the last time we went to [facility], and it helped me to sleep until I got used to the time change.  Thanks, Susu Gage

## 2022-09-02 ENCOUNTER — OFFICE VISIT (OUTPATIENT)
Dept: INTERNAL MEDICINE CLINIC | Age: 72
End: 2022-09-02
Payer: MEDICARE

## 2022-09-02 VITALS
HEIGHT: 61 IN | WEIGHT: 93 LBS | OXYGEN SATURATION: 98 % | DIASTOLIC BLOOD PRESSURE: 68 MMHG | HEART RATE: 78 BPM | BODY MASS INDEX: 17.56 KG/M2 | SYSTOLIC BLOOD PRESSURE: 136 MMHG

## 2022-09-02 DIAGNOSIS — E78.00 HYPERCHOLESTEROLEMIA: ICD-10-CM

## 2022-09-02 DIAGNOSIS — Z00.00 ANNUAL PHYSICAL EXAM: Primary | ICD-10-CM

## 2022-09-02 DIAGNOSIS — M81.0 POSTMENOPAUSAL OSTEOPOROSIS: ICD-10-CM

## 2022-09-02 DIAGNOSIS — R73.01 IMPAIRED FASTING GLUCOSE: ICD-10-CM

## 2022-09-02 DIAGNOSIS — Z13.1 SCREENING FOR DIABETES MELLITUS: ICD-10-CM

## 2022-09-02 PROCEDURE — 99397 PER PM REEVAL EST PAT 65+ YR: CPT | Performed by: INTERNAL MEDICINE

## 2022-09-02 SDOH — ECONOMIC STABILITY: FOOD INSECURITY: WITHIN THE PAST 12 MONTHS, THE FOOD YOU BOUGHT JUST DIDN'T LAST AND YOU DIDN'T HAVE MONEY TO GET MORE.: NEVER TRUE

## 2022-09-02 SDOH — ECONOMIC STABILITY: FOOD INSECURITY: WITHIN THE PAST 12 MONTHS, YOU WORRIED THAT YOUR FOOD WOULD RUN OUT BEFORE YOU GOT MONEY TO BUY MORE.: NEVER TRUE

## 2022-09-02 ASSESSMENT — PATIENT HEALTH QUESTIONNAIRE - PHQ9
SUM OF ALL RESPONSES TO PHQ QUESTIONS 1-9: 0
SUM OF ALL RESPONSES TO PHQ QUESTIONS 1-9: 0
1. LITTLE INTEREST OR PLEASURE IN DOING THINGS: 0
2. FEELING DOWN, DEPRESSED OR HOPELESS: 0
SUM OF ALL RESPONSES TO PHQ QUESTIONS 1-9: 0
SUM OF ALL RESPONSES TO PHQ QUESTIONS 1-9: 0
SUM OF ALL RESPONSES TO PHQ9 QUESTIONS 1 & 2: 0

## 2022-09-02 ASSESSMENT — SOCIAL DETERMINANTS OF HEALTH (SDOH): HOW HARD IS IT FOR YOU TO PAY FOR THE VERY BASICS LIKE FOOD, HOUSING, MEDICAL CARE, AND HEATING?: NOT HARD AT ALL

## 2022-09-02 NOTE — PATIENT INSTRUCTIONS
Preventive plan of care for Jolynn Palmer        9/2/2022           Preventive Measures Status       Recommendations for screening   Colon Cancer Screen   Last colonoscopy: 6/29/2016 Test is due every 10 years. Next colonoscopy due 2026   Breast Cancer Screen  Last mammogram: 10/18/2021 Test is due yearly. Cervical Cancer Screen   Last PAP smear: 1998 Repeat screening is not clinically indicated due to hysterectomy status. Osteoporosis Screen   Last DXA scan: 6/29/2018.   Declined treatment for Osteoporosis Declined testing   Diabetes Screen  Glucose (mg/dL)   Date Value   03/02/2022 91    Test recommended and ordered   Cholesterol Screen  Lab Results   Component Value Date    CHOL 271 (H) 03/02/2022    TRIG 70 03/02/2022     (H) 03/02/2022    LDLCALC 127 (H) 03/02/2022    Test recommended and ordered   Hepatitis C screening recommended for those born between [address] and 1963--4/20/2016   No need to repeat at this time   HIV screening recommended for those ages 12-76  [facility] HIV--not on file Not clinically indicated at this time   Aspirin for Cardiovascular Prevention   Yes Continue a baby aspirin daily for cardiovascular disease prevention    Recommended Immunizations    Immunization History   Administered Date(s) Administered    COVID-19, MODERNA BLUE border, Primary or Immunocompromised, (age 12y+), IM, 100 mcg/0.5mL 02/03/2021, 03/04/2021    COVID-19, MODERNA Booster BLUE border, (age 18y+), IM, 50mcg/0.25mL 11/14/2021, 07/15/2022    Influenza Virus Vaccine 02/13/2014, 10/14/2015, 10/29/2018    Influenza, FLUARIX, FLULAVAL, FLUZONE (age 10 mo+) AND AFLURIA, (age 1 y+), PF, 0.5mL 11/28/2016, 10/29/2018    Influenza, High Dose (Fluzone 65 yrs and older) 12/12/2017, 10/29/2018, 10/05/2020, 10/31/2021    Pneumococcal Conjugate 13-valent ([id number]) 05/31/2017    Pneumococcal Polysaccharide ([id number]) 06/12/2018    Tdap (Boostrix, Adacel) 03/15/2013    Zoster Live (Zostavax) 11/13/2014 Zoster Recombinant (Shingrix) 04/08/2019, 08/04/2019        Influenza vaccine:  recommended every fall      Tetanus vaccine:  tetanus and diptheria/Pertussis vaccine (Td/Tdap) recommended every 10 years- due 2023              Additional Recommendations   1. Use Sunscreen daily to help reduce the risk of skin cancer  2. Continue your healthy lifestyle  3. Update your eye exam every year  4. Always wear a seatbelt while in a car  5. Always wear a helmet while riding a bike      Here are a few  Reliable websites with a variety of health and wellness information:   www.mylifecheck. heart. org     www.nutritionsource. org     www. americanheart. org     www. diabetes. org      www.menopause. org     www.[facility]     www.[email or web address])

## 2022-09-02 NOTE — PROGRESS NOTES
[facility]) Physicians  Internal Medicine  Patient Encounter  Carolann Mathews D.O., [facility] Preventative Physical    Chief Complaint   Patient presents with    Annual Exam       HPI-- 81 y.o. female presents today for a complete annual preventative physical.        Medical/Surgical Histories     Past Medical History:   Diagnosis Date    Arthropathy of cervical facet joint     Cervical spondylosis     Chronic bilateral low back pain without sciatica 11/28/2016    DJD (degenerative joint disease), cervical 10/13/2010    DJD (degenerative joint disease), lumbar 9/10/2010    Heart murmur 7/21/2020    Hyperlipidemia     Impaired fasting glucose 6/12/2018    Lumbar facet arthropathy     Mild mitral valve prolapse     MVP (mitral valve prolapse) 7/21/2020    Osteopenia     PONV (postoperative nausea and vomiting)     RLS (restless legs syndrome)     Scoliosis     Spondylolisthesis           Past Surgical History:   Procedure Laterality Date    CYSTOSCOPY      FINGER TRIGGER RELEASE Right 07/08/2022    Ring finger, Dr. Albina Alanis, TOTAL ABDOMINAL (CERVIX REMOVED)  [place] Right 2008           Medications/Allergies     Prior to Visit Medications    Medication Sig   pravastatin (PRAVACHOL) 20 MG tablet TAKE ONE TABLET BY MOUTH ONCE NIGHTLY   ALPRAZolam (XANAX) 0.25 MG tablet Take 1 tab 30-45 minutes before flight   Ascorbic Acid (VITAMIN C) 500 MG CHEW Take by mouth daily   Multiple Minerals-Vitamins (CALCIUM & VIT D3 BONE HEALTH) LIReliveD Sh[employer]ley brand vitamin. Calcium/vitamin D3 twice daily   aspirin EC 81 MG EC tablet Take 1 tablet by mouth daily   estradiol (ESTRACE VAGINAL) 0.1 MG/GM vaginal cream Place 0.25 g vaginally daily Apply 0.25g with fingertip to the vaginal opening every night at bedtime for 2 weeks, then decrease to twice weekly.   Patient not taking: Reported on 4/7/2022        No Known Allergies      Substance Use History     Social History Tobacco Use    Smoking status: Never    Smokeless tobacco: Never   Vaping Use    Vaping Use: Never used   Substance Use Topics    Alcohol use: Yes     Alcohol/week: 2.0 standard drinks     Types: 2 Glasses of wine per week    Drug use: No          Family History     Family History   Problem Relation Age of Onset    Diabetes Mother     Heart Disease Mother 67        CAD, CABG    Heart Disease Father 37        CAD,CABG, Stents x 2    Diabetes Brother     Heart Disease Brother 76        CAD, Arrhythmia    Diabetes Maternal Grandmother               REVIEW OF SYSTEMS:    CONSTITUTIONAL:  Neg   Recent weight changes, fatigue, fever, chills or night sweats, anorexia, Sleep difficulties  EYES: Neg  Blurry vision, loss of vision, double vision, tearing, itching, eye pain. EARS:  Neg Hearing loss, tinnitus, vertigo, discharge or otalgia. NOSE:  Neg  Rhinorrhea, sneezing, itching, epistaxis, or snoring.  + Seasonal allergy symptoms-- runny nose, sneezing. Symptoms come and go. \"Bad lately. \"    MOUTH/THROAT:  Neg Bleeding gums, hoarseness, sore throat, dysphagia, throat infections, or dentures  RESPIRATORY:  Neg SOB ,wheeze, cough, sputum, hemoptysis. No report of + TB test.    CARDIOVASCULAR:  Neg Chest pain, palpitations, dyspnea on exertion, orthopnea, paroxysmal nocturnal dyspnea or edema of extremities, or claudication.  + History of MVP, + history of murmur. Last Echo 11/24/2022-- WNL. GASTROINTESTINAL:  Neg   Nausea, vomiting, or diarrhea, constipation, hematemesis, heart burn, dysphagia,change in bowel movements or stool caliber, hematochezia, melena, abdominal pain, or food intolerance. Colonoscopy: Yes, 2016, 10 year recall. GENITOURINARY:  Neg  Urinary frequency, hesitancy, urgency, polyuria, dysuria, hematuria, nocturia, incontinence, change in stream, genital pain or swelling, kidney stones, STD's. PAP/SHAHID: Yes, No longer requires PAP smears.     HEMATOLOGIC/LYMPHATIC:  Neg  Anemia, bleeding dyscrasias, easy bruising, blood clots (DVT/PE), transfusions, or enlarged lymph nodes  MUSCULOSKELETAL:  Neg  New myalgias, bone pain, joint pain, joint swelling, radicular pain, or fractures. + Chronic low back pain-- Much improved with yoga. Tramadol as needed during the day. NEUROLOGICAL:  Neg  Loss of Consciousness, memeory loss or forgetfulness, confusion, difficulty concentrating, seizures, insomina, aphasia or dysarthria unilateral weakness or paresthesias, ataxia, headaches, syncope, tremor, or H/O head trauma.  + RLS-- Treated with Tramadol initially years ago. This has been effective. Without the Tramadol, she will wake with jumping and fidgety legs. Dr. Emilia Angel had prescribed the Tramadol initially for the back. She had noticed that the medication helped. PSYCHIATRIC:  Neg  Depression, anxiety, personality changes, nervousness, drug or alcohol use/abuse, H/O psych counseling: No, Psychotropics: Yes. SKIN :  Neg  Rash, nail changes, sun burns, tattoos, change in moles, or skin color changes  ENDOCRINE:  Neg  Polydipsia,polyuria,abnormal weight changes,heat /cold intolerance, Hair changes. She states she will not use the medications to treat. No need for DEXA. No diabetes.    O thyroid disease         Preventive Care:    Health Maintenance   Topic Date Due    Flu vaccine (1) 09/01/2022    Annual Wellness Visit (AWV)  08/31/2023 (Originally 6/13/2019)    Breast cancer screen  10/18/2022    A1C test (Diabetic or Prediabetic)  03/02/2023    Lipids  03/02/2023    Depression Screen  03/02/2023    DTaP/Tdap/Td vaccine (2 - Td or Tdap) 03/15/2023    Colorectal Cancer Screen  06/29/2026    DEXA (modify frequency per FRAX score)  Completed    Shingles vaccine  Completed    Pneumococcal 65+ years Vaccine  Completed    COVID-19 Vaccine  Completed    Hepatitis C screen  Completed    Hepatitis A vaccine  Aged Out    Hepatitis B vaccine  Aged Out    Hib vaccine  Aged Out    Meningococcal (ACWY) are symmetric. MS:  No C/T/L paraspinal tenderness. No scoliosis. No joint effusions. Full joint ROM. PSYCH:  Mood and affect NL. Judgement and insight NL.          ASSESSMENT/PLAN:    1. Annual physical exam  All care gaps identified and addressed  Patient declines further evaluation of osteoporosis. She understands the increased risks of fracture  Consider flu vaccine when she returns from her trip  - Lipid Panel; Future  - Hemoglobin A1C; Future  - Comprehensive Metabolic Panel; Future    2. Impaired fasting glucose    - Hemoglobin A1C; Future  - Comprehensive Metabolic Panel; Future    3. Hypercholesterolemia  Patient actually has a very high HDL. This is likely protective against cardiovascular disease  Continue low-dose pravastatin as part of her overall cardiovascular disease risk reduction strategy given her strong family history. - Lipid Panel; Future      - Hemoglobin A1C; Future  - Comprehensive Metabolic Panel; Future    5. Postmenopausal osteoporosis  As above  Patient declines repeat DEXA               Preventive plan of care for Aneesh Scales        9/2/2022           Preventive Measures Status       Recommendations for screening   Colon Cancer Screen   Last colonoscopy: 6/29/2016 Test is due every 10 years. Next colonoscopy due 2026   Breast Cancer Screen  Last mammogram: 10/18/2021 Test is due yearly. Cervical Cancer Screen   Last PAP smear: 1998 Repeat screening is not clinically indicated due to hysterectomy status. Osteoporosis Screen   Last DXA scan: 6/29/2018.   Declined treatment for Osteoporosis Declined testing   Diabetes Screen  Glucose (mg/dL)   Date Value   03/02/2022 91    Test recommended and ordered   Cholesterol Screen  Lab Results   Component Value Date    CHOL 271 (H) 03/02/2022    TRIG 70 03/02/2022     (H) 03/02/2022    LDLCALC 127 (H) 03/02/2022    Test recommended and ordered   Hepatitis C screening recommended for those born between [address] [place] [address] and 1963--4/20/2016   No need to repeat at this time   HIV screening recommended for those ages 12-76  [facility] HIV--not on file Not clinically indicated at this time   Aspirin for Cardiovascular Prevention   Yes Continue a baby aspirin daily for cardiovascular disease prevention    Recommended Immunizations    Immunization History   Administered Date(s) Administered    COVID-19, MODERNA LICO border, Primary or Immunocompromised, (age 12y+), IM, 100 mcg/0.5mL 02/03/2021, 03/04/2021    COVID-19, MODERNA Booster BLUE border, (age 18y+), IM, 50mcg/0.25mL 11/14/2021, 07/15/2022    Influenza Virus Vaccine 02/13/2014, 10/14/2015, 10/29/2018    Influenza, FLUARIX, FLULAVAL, [place] Ebbing (age 10 mo+) AND AFLURIA, (age 1 y+), PF, 0.5mL 11/28/2016, 10/29/2018    Influenza, High Dose (Fluzone 65 yrs and older) 12/12/2017, 10/29/2018, 10/05/2020, 10/31/2021    Pneumococcal Conjugate 13-valent ([id number]) 05/31/2017    Pneumococcal Polysaccharide ([id number]) 06/12/2018    Tdap (Boostrix, Adacel) 03/15/2013    Zoster Live (Zostavax) 11/13/2014    Zoster Recombinant (Shingrix) 04/08/2019, 08/04/2019        Influenza vaccine:  recommended every fall      Tetanus vaccine:  tetanus and diptheria/Pertussis vaccine (Td/Tdap) recommended every 10 years- due 2023              Additional Recommendations   1. Use Sunscreen daily to help reduce the risk of skin cancer  2. Continue your healthy lifestyle  3. Update your eye exam every year  4. Always wear a seatbelt while in a car  5. Always wear a helmet while riding a bike      Here are a few  Reliable websites with a variety of health and wellness information:   www.mylifecheck. heart. org     www.nutritionsource. org     www. americanheart. org     www. diabetes. org      www.menopause. org     www.[facility]     www.[address] [place] [address])      The ASCVD Risk score (Jeralene Brunner., et al., 2013) failed to calculate for the following reasons:     The valid HDL cholesterol range is 20 to 100 mg/dL

## 2022-10-26 DIAGNOSIS — G89.29 CHRONIC BILATERAL LOW BACK PAIN WITHOUT SCIATICA: Primary | ICD-10-CM

## 2022-10-26 DIAGNOSIS — M54.50 CHRONIC BILATERAL LOW BACK PAIN WITHOUT SCIATICA: Primary | ICD-10-CM

## 2022-10-26 DIAGNOSIS — G25.81 RLS (RESTLESS LEGS SYNDROME): ICD-10-CM

## 2022-10-28 RX ORDER — TRAMADOL HYDROCHLORIDE 50 MG/1
TABLET ORAL
Qty: 60 TABLET | Refills: 0 | Status: SHIPPED | OUTPATIENT
Start: 2022-10-28 | End: 2022-11-27

## 2022-12-16 DIAGNOSIS — G89.29 CHRONIC BILATERAL LOW BACK PAIN WITHOUT SCIATICA: Primary | ICD-10-CM

## 2022-12-16 DIAGNOSIS — G25.81 RLS (RESTLESS LEGS SYNDROME): ICD-10-CM

## 2022-12-16 DIAGNOSIS — M54.50 CHRONIC BILATERAL LOW BACK PAIN WITHOUT SCIATICA: Primary | ICD-10-CM

## 2022-12-16 RX ORDER — TRAMADOL HYDROCHLORIDE 50 MG/1
TABLET ORAL
Qty: 60 TABLET | Refills: 2 | Status: SHIPPED | OUTPATIENT
Start: 2022-12-16 | End: 2023-06-14

## 2023-01-27 DIAGNOSIS — F40.243 ANXIETY WITH FLYING: ICD-10-CM

## 2023-01-27 RX ORDER — ALPRAZOLAM 0.25 MG/1
TABLET ORAL
Qty: 20 TABLET | Refills: 0 | Status: SHIPPED | OUTPATIENT
Start: 2023-01-27 | End: 2023-04-26

## 2023-03-02 ENCOUNTER — OFFICE VISIT (OUTPATIENT)
Dept: INTERNAL MEDICINE CLINIC | Age: 73
End: 2023-03-02
Payer: MEDICARE

## 2023-03-02 VITALS
BODY MASS INDEX: 17.37 KG/M2 | WEIGHT: 92 LBS | RESPIRATION RATE: 14 BRPM | SYSTOLIC BLOOD PRESSURE: 132 MMHG | HEIGHT: 61 IN | OXYGEN SATURATION: 96 % | HEART RATE: 78 BPM | DIASTOLIC BLOOD PRESSURE: 66 MMHG

## 2023-03-02 DIAGNOSIS — S86.812A STRAIN OF CALF MUSCLE, LEFT, INITIAL ENCOUNTER: Primary | ICD-10-CM

## 2023-03-02 PROCEDURE — 99213 OFFICE O/P EST LOW 20 MIN: CPT | Performed by: INTERNAL MEDICINE

## 2023-03-02 PROCEDURE — 1123F ACP DISCUSS/DSCN MKR DOCD: CPT | Performed by: INTERNAL MEDICINE

## 2023-03-02 RX ORDER — NAPROXEN 500 MG/1
500 TABLET ORAL 2 TIMES DAILY WITH MEALS
Qty: 14 TABLET | Refills: 1 | Status: SHIPPED | OUTPATIENT
Start: 2023-03-02

## 2023-03-02 SDOH — ECONOMIC STABILITY: HOUSING INSECURITY
IN THE LAST 12 MONTHS, WAS THERE A TIME WHEN YOU DID NOT HAVE A STEADY PLACE TO SLEEP OR SLEPT IN A SHELTER (INCLUDING NOW)?: NO

## 2023-03-02 SDOH — ECONOMIC STABILITY: FOOD INSECURITY: WITHIN THE PAST 12 MONTHS, YOU WORRIED THAT YOUR FOOD WOULD RUN OUT BEFORE YOU GOT MONEY TO BUY MORE.: NEVER TRUE

## 2023-03-02 SDOH — ECONOMIC STABILITY: FOOD INSECURITY: WITHIN THE PAST 12 MONTHS, THE FOOD YOU BOUGHT JUST DIDN'T LAST AND YOU DIDN'T HAVE MONEY TO GET MORE.: NEVER TRUE

## 2023-03-02 SDOH — ECONOMIC STABILITY: INCOME INSECURITY: HOW HARD IS IT FOR YOU TO PAY FOR THE VERY BASICS LIKE FOOD, HOUSING, MEDICAL CARE, AND HEATING?: NOT HARD AT ALL

## 2023-03-02 ASSESSMENT — PATIENT HEALTH QUESTIONNAIRE - PHQ9
2. FEELING DOWN, DEPRESSED OR HOPELESS: 0
SUM OF ALL RESPONSES TO PHQ QUESTIONS 1-9: 0
1. LITTLE INTEREST OR PLEASURE IN DOING THINGS: 0
SUM OF ALL RESPONSES TO PHQ QUESTIONS 1-9: 0
SUM OF ALL RESPONSES TO PHQ9 QUESTIONS 1 & 2: 0

## 2023-03-02 NOTE — PROGRESS NOTES
[facility]) Physicians  Internal Medicine  Patient Encounter  Yue Junior D.O., [facility]        Chief Complaint   Patient presents with    Leg Pain     Mainly the right leg, pain in both legs in the shins while walking on the beach       HPI: 81 y.o. female seen today complaining of left calf pain. Symptoms started about a week ago shortly after arriving home from a trip to [place]. Instead of flying, they drove there and back. They stopped every few hours to walk. She states that they did a lot of beach walking and she was having a lot of pain in her shins. She also describes some mild discomfort in the right calf but the left calf is bothering her more. She denies any swelling. The pain seems to increase with walking. She was concerned about a blood clot. She has no prior history of DVT. There is no family history of inherited thrombophilic disorders. Past Medical History:   Diagnosis Date    Arthropathy of cervical facet joint     Cervical spondylosis     Chronic bilateral low back pain without sciatica 11/28/2016    DJD (degenerative joint disease), cervical 10/13/2010    DJD (degenerative joint disease), lumbar 9/10/2010    Heart murmur 7/21/2020    Hyperlipidemia     Impaired fasting glucose 6/12/2018    Lumbar facet arthropathy     Mild mitral valve prolapse     MVP (mitral valve prolapse) 7/21/2020    Osteopenia     PONV (postoperative nausea and vomiting)     RLS (restless legs syndrome)     Scoliosis     Spondylolisthesis          MEDICATIONS:  Prior to Visit Medications    Medication Sig Taking? Authorizing Provider   naproxen (NAPROSYN) 500 MG tablet Take 1 tablet by mouth 2 times daily (with meals) Yes Xavier Church, DO   ALPRAZolam (XANAX) 0.25 MG tablet Take 1 tab 30-45 minutes before flight Yes Xavier Church DO   traMADol (ULTRAM) 50 MG tablet TAKE ONE TABLET BY MOUTH DAILY.  MAY TAKE ONE MORE TABLET AS NEEDED Yes Xavier Church DO   pravastatin (PRAVACHOL) 20 MG tablet TAKE ONE TABLET BY MOUTH ONCE NIGHTLY Yes Xavier Church, DO   Ascorbic Acid (VITAMIN C) 500 MG CHEW Take by mouth daily Yes Xavier Church DO   Multiple Minerals-Vitamins (CALCIUM & VIT D3 BONE HEALTH) LIQD [employer] brand vitamin. Calcium/vitamin D3 twice daily Yes Xavier Church DO   aspirin EC 81 MG EC tablet Take 1 tablet by mouth daily Yes Xavier Church DO           Review of Systems - As per HPI      OBJECTIVE:  Vitals:    03/02/23 1204   BP: 132/66   Pulse: 78   Resp: 14   SpO2: 96%   Weight: 92 lb (41.7 kg)   Height: 5' 1\" (1.549 m)     Body mass index is 17.38 kg/m². Wt Readings from Last 3 Encounters:   03/02/23 92 lb (41.7 kg)   09/02/22 93 lb (42.2 kg)   03/02/22 93 lb (42.2 kg)     BP Readings from Last 3 Encounters:   03/02/23 132/66   09/02/22 136/68   04/07/22 (!) 147/75        GEN: NAD, A&O, Non-toxic  MS: Very minimal tenderness with palpation of the proximal portion of the gastrocnemius on the left. The calves measure 28 cm and are symmetric. Calf is soft. Negative Homans' sign. EXT: No edema. NEURO: No focal or lateralizing deficits. VASC:  No carotid bruits. Pedal pulses symmetric. No evidence of palpable cord. SKIN:  No rashes or lesions of concern      ASSESSMENT/PLAN:    1. Strain of calf muscle, left, initial encounter  Suspect strain to her left calf more so than the right  We will try anti-inflammatory  If symptoms persist and do not improve over the next few days, we will further evaluate  Suspicion for DVT very low.  - naproxen (NAPROSYN) 500 MG tablet; Take 1 tablet by mouth 2 times daily (with meals)  Dispense: 14 tablet; Refill: 1      Advised patient to call should symptoms persist, worsen or if new symptoms develop. Discussed medications with patient who voiced understanding of their use, indication and potential side effects. Pt also understands the above recommendations. All questions answered.     This note was generated completely or in part utilizing Dragon dictation speech recognition software. Occasionally, words are mistranscribed and despite editing, the text may contain inaccuracies due to incorrect word recognition.   If further clarification is needed please contact the office at [phone]       Electronically signed    Domingo Murillo D.O.

## 2023-03-11 ENCOUNTER — OFFICE VISIT (OUTPATIENT)
Dept: INTERNAL MEDICINE CLINIC | Age: 73
End: 2023-03-11
Payer: MEDICARE

## 2023-03-11 VITALS
DIASTOLIC BLOOD PRESSURE: 68 MMHG | SYSTOLIC BLOOD PRESSURE: 126 MMHG | HEIGHT: 61 IN | RESPIRATION RATE: 14 BRPM | OXYGEN SATURATION: 99 % | HEART RATE: 64 BPM | WEIGHT: 94 LBS | BODY MASS INDEX: 17.75 KG/M2

## 2023-03-11 DIAGNOSIS — E78.00 HYPERCHOLESTEROLEMIA: Primary | ICD-10-CM

## 2023-03-11 DIAGNOSIS — M47.816 LUMBAR FACET ARTHROPATHY: ICD-10-CM

## 2023-03-11 DIAGNOSIS — G89.29 CHRONIC BILATERAL LOW BACK PAIN WITHOUT SCIATICA: ICD-10-CM

## 2023-03-11 DIAGNOSIS — M54.50 CHRONIC BILATERAL LOW BACK PAIN WITHOUT SCIATICA: ICD-10-CM

## 2023-03-11 DIAGNOSIS — F40.243 ANXIETY WITH FLYING: ICD-10-CM

## 2023-03-11 DIAGNOSIS — Z82.49 FAMILY HISTORY OF EARLY CAD: ICD-10-CM

## 2023-03-11 DIAGNOSIS — R73.01 IMPAIRED FASTING GLUCOSE: ICD-10-CM

## 2023-03-11 DIAGNOSIS — Z13.6 SCREENING FOR HEART DISEASE: ICD-10-CM

## 2023-03-11 DIAGNOSIS — G25.81 RLS (RESTLESS LEGS SYNDROME): ICD-10-CM

## 2023-03-11 PROCEDURE — 1123F ACP DISCUSS/DSCN MKR DOCD: CPT | Performed by: INTERNAL MEDICINE

## 2023-03-11 PROCEDURE — 99214 OFFICE O/P EST MOD 30 MIN: CPT | Performed by: INTERNAL MEDICINE

## 2023-03-11 NOTE — PROGRESS NOTES
[facility] Physicians  Internal Medicine  Patient Encounter  Xavier Church D.O., [facility]        Chief Complaint   Patient presents with    Check-Up    Medication Check       HPI: 72 y.o. female with multiple medical issues seen today requesting her routine checkup regarding the status of current chronic medical problems listed below along with a medication review and reconciliation.    Patient was recently seen on 3/2/2023 for leg pain particularly in the calf.  Please see note for details.  Overall she is 100% better.    Also, she has additional complaints of ears feel full.  Worse in the AM.  Hearing is decreased.      Chronic low back pain-- Previous history ----> Pain generators include Lumbar facet arthropathy.     Interval Hx----> patient remains on tramadol.  She takes 1 or 2 daily.  The tramadol actually helps her with her restless leg syndrome.  Another provider had started her on tramadol for this particular problem which was effective.  She has not had any problems with misuse or abuse.  She denies any adverse side effects.  The medication is effective at controlling her back pain when it is exacerbated.  She denies any radicular pain or paresthesias.  Denies any bowel or bladder dysfunction.    RLS--Well controlled.  She states the Tramadol has always been effective. Legs get restless when at rest for prolonged periods like watching a movie    Hyperlipidemia:    Lab Results   Component Value Date    LDLCALC 127 (H) 03/02/2022   Patient remains compliant with pravastatin 20 mg nightly.    Patient not do her blood work ordered back in September.  These orders are still open.  She denies any adverse side effects from the pravastatin.  Patient's HDL is significantly elevated suggesting a protective effect.  Interestingly, she has a strong history of coronary artery disease in her brother, father, and mother.    Osteoporosis--patient has seen Dr. Fierro, endocrinology.  Prolia was recommended.  She has  declined therapy and further eval.     Situational anxiety--She uses the Xanax when flying or for prolonged car rides. This works well without adverse effects. She gonzales snot drink alcohol with the medication. Past Medical History:   Diagnosis Date    Arthropathy of cervical facet joint     Cervical spondylosis     Chronic bilateral low back pain without sciatica 11/28/2016    Cystocele with prolapse     DJD (degenerative joint disease), cervical 10/13/2010    DJD (degenerative joint disease), lumbar 09/10/2010    Heart murmur 07/21/2020    Hyperlipidemia     Impaired fasting glucose 06/12/2018    Lumbar facet arthropathy     Mild mitral valve prolapse     MVP (mitral valve prolapse) 07/21/2020    Osteopenia     PONV (postoperative nausea and vomiting)     RLS (restless legs syndrome)     Scoliosis     Spondylolisthesis        Review of Systems - As per HPI      OBJECTIVE:  Vitals:    03/11/23 0920   BP: 126/68   Pulse: 64   Resp: 14   SpO2: 99%   Weight: 94 lb (42.6 kg)   Height: 5' 1\" (1.549 m)     Body mass index is 17.76 kg/m². Wt Readings from Last 3 Encounters:   03/11/23 94 lb (42.6 kg)   03/02/23 92 lb (41.7 kg)   09/02/22 93 lb (42.2 kg)     BP Readings from Last 3 Encounters:   03/11/23 126/68   03/02/23 132/66   09/02/22 136/68      GEN: NAD, A&O, Non-toxic  HEENT: NC/AT, SUZE, EOMI, anicteric  NECK: Supple. No thyromegaly. No JVD. LYMPH: No C/SC nodes. CV: Regular rhythm. Rate normal.  1/6 systolic murmur-no worse  PULM: CTA  EXT: No edema. GI: Abdomen is soft, NT  NEURO: No focal or lateralizing deficits. No ataxia. Moves all extremities symmetrically. VASC:  No carotid bruits. Pulses symmetric  MS: Increased soft tissue tone but no tenderness of the thoracic and lumbar paraspinals. She has a significant thoracolumbar scoliosis with most of the convexity in the lumbar spine. Convexity to the left side bent right      ASSESSMENT/PLAN:    1.  Hypercholesterolemia  Patient on pravastatin 20 mg daily  Check labs to ensure stability  Will check a CT cardiac calcium score to evaluate for coronary atherosclerosis  - CT CARDIAC CALCIUM SCORING; Future    2. Family history of early CAD    - CT CARDIAC CALCIUM SCORING; Future    3. Screening for heart disease    - CT CARDIAC CALCIUM SCORING; Future    4. Chronic bilateral low back pain without sciatica  Well-controlled  Patient will continue tramadol as needed  [place] checked today  Patient indicates medication is effective. There have been no problems with misuse or abuse    5. Lumbar facet arthropathy  As above    6. RLS (restless legs syndrome)  Well-controlled  Patient was started on tramadol for this particular problem before she started seeing me  The medication is effective and she has no adverse effects  We will continue    7. Impaired fasting glucose  Control uncertain  Overdue for lab  Continue a sensible low simple sugar diet    8. Anxiety with flying  Xanax works well. Patient will call when traveling so she can get a refill  Her and her  do travel quite a bit. Discussed medications with patient who voiced understanding of their use and indication. All questions answered. This note was generated completely or in part utilizing Dragon dictation speech recognition software. Occasionally, words are mistranscribed and despite editing, the text may contain inaccuracies due to incorrect word recognition.   If further clarification is needed please contact the office at [phone]

## 2023-05-12 DIAGNOSIS — G89.29 CHRONIC BILATERAL LOW BACK PAIN WITHOUT SCIATICA: ICD-10-CM

## 2023-05-12 DIAGNOSIS — G25.81 RLS (RESTLESS LEGS SYNDROME): ICD-10-CM

## 2023-05-12 DIAGNOSIS — M54.50 CHRONIC BILATERAL LOW BACK PAIN WITHOUT SCIATICA: ICD-10-CM

## 2023-05-12 RX ORDER — TRAMADOL HYDROCHLORIDE 50 MG/1
TABLET ORAL
Qty: 60 TABLET | Refills: 2 | Status: SHIPPED | OUTPATIENT
Start: 2023-05-12 | End: 2023-08-10

## 2023-06-05 DIAGNOSIS — F40.243 ANXIETY WITH FLYING: ICD-10-CM

## 2023-06-05 RX ORDER — ALPRAZOLAM 0.25 MG/1
TABLET ORAL
Qty: 20 TABLET | Refills: 0 | Status: SHIPPED | OUTPATIENT
Start: 2023-06-05 | End: 2023-09-02

## 2023-06-26 ENCOUNTER — TELEPHONE (OUTPATIENT)
Dept: INTERNAL MEDICINE CLINIC | Age: 73
End: 2023-06-26

## 2023-07-21 ENCOUNTER — OFFICE VISIT (OUTPATIENT)
Dept: INTERNAL MEDICINE CLINIC | Age: 73
End: 2023-07-21
Payer: MEDICARE

## 2023-07-21 VITALS
DIASTOLIC BLOOD PRESSURE: 84 MMHG | OXYGEN SATURATION: 99 % | RESPIRATION RATE: 14 BRPM | BODY MASS INDEX: 17.48 KG/M2 | HEART RATE: 66 BPM | SYSTOLIC BLOOD PRESSURE: 128 MMHG | WEIGHT: 92.6 LBS | HEIGHT: 61 IN

## 2023-07-21 DIAGNOSIS — G25.81 RLS (RESTLESS LEGS SYNDROME): ICD-10-CM

## 2023-07-21 DIAGNOSIS — G25.81 RLS (RESTLESS LEGS SYNDROME): Primary | ICD-10-CM

## 2023-07-21 PROCEDURE — 1123F ACP DISCUSS/DSCN MKR DOCD: CPT | Performed by: INTERNAL MEDICINE

## 2023-07-21 PROCEDURE — 99214 OFFICE O/P EST MOD 30 MIN: CPT | Performed by: INTERNAL MEDICINE

## 2023-07-21 RX ORDER — GABAPENTIN 100 MG/1
100 CAPSULE ORAL NIGHTLY
Qty: 90 CAPSULE | Refills: 0 | Status: SHIPPED | OUTPATIENT
Start: 2023-07-21 | End: 2023-10-19

## 2023-07-21 NOTE — PROGRESS NOTES
[facility] Physicians  Internal Medicine  Patient Encounter  Shanita Soria D.O., [facility]        Chief Complaint   Patient presents with    Other     Discuss restless legs, tramadol is not working        HPI: 72 y.o. female with longstanding history of restless leg syndrome presents today reporting that her tramadol is not working. Years ago, another physician started her on tramadol for her back. She states that the Tramadol seemed to help her RLS. In recent weeks her symptoms have worsened. She reports she has an urge to move her legs. She also reports a creepy crawly sensation in both legs. Symptoms are worse in the evening when she is relaxed and while in bed. She took a Tramadol in preparation to go to a movie. It used to be that she had restless legs only in bed. No symptoms during the day as she is very active. Symptoms are better if she gets up and walk. Her  noticed her legs moving. She has to get out of bed. She does not drink a lot of alcohol. She has two cups of coffee daily. She does not use the Tramadol every day for her back. She is back to [employer].       Past Medical History:   Diagnosis Date    Arthropathy of cervical facet joint     Cervical spondylosis     Chronic bilateral low back pain without sciatica 11/28/2016    Cystocele with prolapse     DJD (degenerative joint disease), cervical 10/13/2010    DJD (degenerative joint disease), lumbar 09/10/2010    Heart murmur 07/21/2020    Hyperlipidemia     Impaired fasting glucose 06/12/2018    Lumbar facet arthropathy     Mild mitral valve prolapse     MVP (mitral valve prolapse) 07/21/2020    Osteopenia     PONV (postoperative nausea and vomiting)     RLS (restless legs syndrome)     Scoliosis     Spondylolisthesis          MEDICATIONS:  Prior to Visit Medications    Medication Sig   ALPRAZolam (XANAX) 0.25 MG tablet Take 1 tab 30-45 minutes before flight   traMADol (ULTRAM) 50 MG tablet TAKE ONE TABLET BY MOUTH DAILY

## 2023-07-22 LAB
FERRITIN SERPL IA-MCNC: 53.2 NG/ML (ref 15–150)
IRON SATN MFR SERPL: 44 % (ref 15–50)
IRON SERPL-MCNC: 144 UG/DL (ref 37–145)
TIBC SERPL-MCNC: 326 UG/DL (ref 260–445)

## 2023-08-04 DIAGNOSIS — G25.81 RLS (RESTLESS LEGS SYNDROME): Primary | ICD-10-CM

## 2023-08-04 RX ORDER — ROPINIROLE 0.25 MG/1
TABLET, FILM COATED ORAL
Qty: 30 TABLET | Refills: 0 | Status: SHIPPED | OUTPATIENT
Start: 2023-08-04

## 2023-08-09 DIAGNOSIS — G25.81 RLS (RESTLESS LEGS SYNDROME): Primary | ICD-10-CM

## 2023-08-09 RX ORDER — PRAMIPEXOLE DIHYDROCHLORIDE 0.12 MG/1
0.12 TABLET ORAL NIGHTLY
Qty: 30 TABLET | Refills: 0 | Status: SHIPPED | OUTPATIENT
Start: 2023-08-09

## 2023-08-21 DIAGNOSIS — G25.81 RLS (RESTLESS LEGS SYNDROME): Primary | ICD-10-CM

## 2023-08-21 RX ORDER — PRAMIPEXOLE DIHYDROCHLORIDE 0.25 MG/1
0.25 TABLET ORAL NIGHTLY
Qty: 90 TABLET | Refills: 0 | Status: SHIPPED | OUTPATIENT
Start: 2023-08-21

## 2023-08-21 RX ORDER — PRAMIPEXOLE DIHYDROCHLORIDE 0.5 MG/1
0.5 TABLET ORAL NIGHTLY
Qty: 90 TABLET | Refills: 0 | Status: SHIPPED
Start: 2023-08-21 | End: 2023-08-21 | Stop reason: CLARIF

## 2023-08-22 RX ORDER — PRAVASTATIN SODIUM 20 MG
TABLET ORAL
Qty: 90 TABLET | Refills: 3 | Status: SHIPPED | OUTPATIENT
Start: 2023-08-22

## 2023-08-22 NOTE — TELEPHONE ENCOUNTER
Medication:   Requested Prescriptions     Pending Prescriptions Disp Refills    pravastatin (PRAVACHOL) 20 MG tablet [Pharmacy Med Name: PRAVASTATIN SODIUM 20 MG TAB] 90 tablet 3     Sig: TAKE ONE TABLET BY MOUTH ONCE NIGHTLY        Last Filled:  8/30/22    Patient Phone Number: [phone] (home)     Last appt: 7/21/2023   Next appt: 9/15/2023    Last OARRS:   RX Monitoring 6/5/2023   Attestation -   Periodic Controlled Substance Monitoring No signs of potential drug abuse or diversion identified.

## 2023-09-12 SDOH — HEALTH STABILITY: PHYSICAL HEALTH: ON AVERAGE, HOW MANY MINUTES DO YOU ENGAGE IN EXERCISE AT THIS LEVEL?: 60 MIN

## 2023-09-12 SDOH — HEALTH STABILITY: PHYSICAL HEALTH: ON AVERAGE, HOW MANY DAYS PER WEEK DO YOU ENGAGE IN MODERATE TO STRENUOUS EXERCISE (LIKE A BRISK WALK)?: 7 DAYS

## 2023-09-12 ASSESSMENT — PATIENT HEALTH QUESTIONNAIRE - PHQ9
2. FEELING DOWN, DEPRESSED OR HOPELESS: 0
SUM OF ALL RESPONSES TO PHQ QUESTIONS 1-9: 0
SUM OF ALL RESPONSES TO PHQ9 QUESTIONS 1 & 2: 0
SUM OF ALL RESPONSES TO PHQ QUESTIONS 1-9: 0
SUM OF ALL RESPONSES TO PHQ QUESTIONS 1-9: 0
1. LITTLE INTEREST OR PLEASURE IN DOING THINGS: 0
SUM OF ALL RESPONSES TO PHQ QUESTIONS 1-9: 0

## 2023-09-12 ASSESSMENT — LIFESTYLE VARIABLES
HOW MANY STANDARD DRINKS CONTAINING ALCOHOL DO YOU HAVE ON A TYPICAL DAY: 1 OR 2
HOW OFTEN DO YOU HAVE A DRINK CONTAINING ALCOHOL: 2-3 TIMES A WEEK

## 2023-09-14 SDOH — HEALTH STABILITY: PHYSICAL HEALTH: ON AVERAGE, HOW MANY DAYS PER WEEK DO YOU ENGAGE IN MODERATE TO STRENUOUS EXERCISE (LIKE A BRISK WALK)?: 7 DAYS

## 2023-09-14 SDOH — HEALTH STABILITY: PHYSICAL HEALTH: ON AVERAGE, HOW MANY MINUTES DO YOU ENGAGE IN EXERCISE AT THIS LEVEL?: 60 MIN

## 2023-09-14 ASSESSMENT — PATIENT HEALTH QUESTIONNAIRE - PHQ9
SUM OF ALL RESPONSES TO PHQ QUESTIONS 1-9: 0
1. LITTLE INTEREST OR PLEASURE IN DOING THINGS: 0
SUM OF ALL RESPONSES TO PHQ QUESTIONS 1-9: 0
SUM OF ALL RESPONSES TO PHQ9 QUESTIONS 1 & 2: 0
SUM OF ALL RESPONSES TO PHQ QUESTIONS 1-9: 0
SUM OF ALL RESPONSES TO PHQ QUESTIONS 1-9: 0
2. FEELING DOWN, DEPRESSED OR HOPELESS: 0

## 2023-09-14 ASSESSMENT — LIFESTYLE VARIABLES
HOW OFTEN DO YOU HAVE A DRINK CONTAINING ALCOHOL: 4
HOW OFTEN DO YOU HAVE A DRINK CONTAINING ALCOHOL: 2-3 TIMES A WEEK
HOW MANY STANDARD DRINKS CONTAINING ALCOHOL DO YOU HAVE ON A TYPICAL DAY: 1
HOW MANY STANDARD DRINKS CONTAINING ALCOHOL DO YOU HAVE ON A TYPICAL DAY: 1 OR 2
HOW OFTEN DO YOU HAVE SIX OR MORE DRINKS ON ONE OCCASION: 1

## 2023-09-15 ENCOUNTER — OFFICE VISIT (OUTPATIENT)
Dept: INTERNAL MEDICINE CLINIC | Age: 73
End: 2023-09-15

## 2023-09-15 VITALS
BODY MASS INDEX: 17.79 KG/M2 | WEIGHT: 94.2 LBS | HEIGHT: 61 IN | SYSTOLIC BLOOD PRESSURE: 134 MMHG | RESPIRATION RATE: 12 BRPM | HEART RATE: 54 BPM | OXYGEN SATURATION: 90 % | DIASTOLIC BLOOD PRESSURE: 82 MMHG

## 2023-09-15 DIAGNOSIS — Z13.1 SCREENING FOR DIABETES MELLITUS: ICD-10-CM

## 2023-09-15 DIAGNOSIS — R73.01 IMPAIRED FASTING GLUCOSE: ICD-10-CM

## 2023-09-15 DIAGNOSIS — R73.02 IMPAIRED GLUCOSE TOLERANCE: ICD-10-CM

## 2023-09-15 DIAGNOSIS — G25.81 RLS (RESTLESS LEGS SYNDROME): ICD-10-CM

## 2023-09-15 DIAGNOSIS — Z23 NEED FOR TDAP VACCINATION: ICD-10-CM

## 2023-09-15 DIAGNOSIS — Z00.00 ANNUAL PHYSICAL EXAM: Primary | ICD-10-CM

## 2023-09-15 DIAGNOSIS — R93.1 AGATSTON CAC SCORE, <100: ICD-10-CM

## 2023-09-15 LAB — HBA1C MFR BLD: 5.6 %

## 2023-09-15 RX ORDER — PRAMIPEXOLE DIHYDROCHLORIDE 0.25 MG/1
0.38 TABLET ORAL NIGHTLY
Qty: 90 TABLET | Refills: 0 | Status: SHIPPED | OUTPATIENT
Start: 2023-09-15

## 2023-09-15 RX ORDER — FERROUS SULFATE 325(65) MG
325 TABLET ORAL
COMMUNITY

## 2023-09-15 NOTE — PATIENT INSTRUCTIONS
Preventive plan of care for Bro Sanz        9/15/2023           Preventive Measures Status       Recommendations for screening   Colon Cancer Screen   Last colonoscopy: 6/29/2016 Test is due every 10 years. Next colonoscopy due 2026   Breast Cancer Screen  Last mammogram: 12/9/2022 Test is due yearly. Cervical Cancer Screen   Last PAP smear: 1998 Repeat screening is not clinically indicated due to hysterectomy status. Osteoporosis Screen   Last DXA scan: 6/29/2018. Declined treatment for Osteoporosis Declined testing   Diabetes Screen  Glucose (mg/dL)   Date Value   06/15/2023 96    A1C ordered. Cholesterol Screen  Lab Results   Component Value Date    CHOL 258 06/15/2023    TRIG 86 06/15/2023     (A) 06/15/2023    LDLCALC 126 06/15/2023    Will check next 6 month visit.    Hepatitis C screening recommended for those born between 1945 and 1965--4/20/2016   No need to repeat at this time   HIV screening recommended for those ages 14-79  [address] HIV--not on file Not clinically indicated at this time   Aspirin for Cardiovascular Prevention   Yes Continue a baby aspirin daily for cardiovascular disease prevention    Recommended Immunizations    Immunization History   Administered Date(s) Administered    COVID-19, MODERNA [place] border, Primary or Immunocompromised, (age 12y+), IM, 100 mcg/0.5mL 02/03/2021, 03/04/2021    COVID-19, MODERNA Booster BLUE border, (age 18y+), IM, 50mcg/0.25mL 11/14/2021, 07/15/2022    Influenza Virus Vaccine 02/13/2014, 10/14/2015, 10/29/2018    Influenza, FLUARIX, FLULAVAL, FLUZONE (age 10 mo+) AND AFLURIA, (age 1 y+), PF, 0.5mL 11/28/2016, 10/29/2018    Influenza, FLUZONE (age 72 y+), High Dose, 0.7mL 10/24/2022    Influenza, High Dose (Fluzone 65 yrs and older) 12/12/2017, 10/29/2018, 10/05/2020, 10/31/2021    Pneumococcal, PCV-13, PREVNAR 15, (age 6w+), IM, 0.5mL 05/31/2017    Pneumococcal, PPSV23, PNEUMOVAX 23, (age 2y+), SC/IM, 0.5mL 06/12/2018

## 2023-09-15 NOTE — PROGRESS NOTES
[facility]) Physicians  Internal Medicine  Patient Encounter  Monse Ortega D.O., [facility] Preventative Physical    Chief Complaint   Patient presents with    Annual Exam       HPI-- 72 y.o. female presents today for a complete annual preventative physical.        Medical/Surgical Histories     Past Medical History:   Diagnosis Date    Arthropathy of cervical facet joint     Cervical spondylosis     Chronic bilateral low back pain without sciatica 2016    Cystocele with prolapse     DJD (degenerative joint disease), cervical 10/13/2010    DJD (degenerative joint disease), lumbar 09/10/2010    Heart murmur 2020    Hyperlipidemia     Impaired fasting glucose 2018    Lumbar facet arthropathy     Mild mitral valve prolapse     MVP (mitral valve prolapse) 2020    Osteopenia     PONV (postoperative nausea and vomiting)     RLS (restless legs syndrome)     Scoliosis     Spondylolisthesis           Past Surgical History:   Procedure Laterality Date    CYSTOSCOPY      FINGER TRIGGER RELEASE Right 2022    Ring finger, Dr. Ifeanyi Mercer, [address], TOTAL ABDOMINAL (CERVIX REMOVED)  [address] Right            Medications/Allergies     Prior to Visit Medications    Medication Sig   pravastatin (PRAVACHOL) 20 MG tablet TAKE ONE TABLET BY MOUTH ONCE NIGHTLY   ALPRAZolam (XANAX) 0.25 MG tablet Take 1 tab 30-45 minutes before flight   Ascorbic Acid (VITAMIN C) 500 MG CHEW Take by mouth daily   Multiple Minerals-Vitamins (CALCIUM & VIT D3 BONE HEALTH) LI[employer] brand vitamin. Calcium/vitamin D3 twice daily   aspirin EC 81 MG EC tablet Take 1 tablet by mouth daily   estradiol (ESTRACE VAGINAL) 0.1 MG/GM vaginal cream Place 0.25 g vaginally daily Apply 0.25g with fingertip to the vaginal opening every night at bedtime for 2 weeks, then decrease to twice weekly.   Patient not taking: Reported on 2022        No Known Allergies      Substance Use

## 2023-10-10 ENCOUNTER — PATIENT MESSAGE (OUTPATIENT)
Dept: INTERNAL MEDICINE CLINIC | Age: 73
End: 2023-10-10

## 2023-10-10 NOTE — TELEPHONE ENCOUNTER
From: Nany Espinosa  To: Dr. Bartholomew Bird: 10/10/2023 5:26 AM EDT  Subject: Covid Vaccine     Stella is supposed to be contacting you, but just wanted you to know I got my [place] vaccine yesterday.

## 2024-01-03 ENCOUNTER — TELEPHONE (OUTPATIENT)
Dept: INTERNAL MEDICINE CLINIC | Age: 74
End: 2024-01-03

## 2024-01-03 NOTE — TELEPHONE ENCOUNTER
Patient is calling back for  in regards to sx, per patient sx started Monday evening with headache and cough, Per patient yesterday she had a fever of 99  and was able to break last night. Patient stated she took an at home test for covid yesterday morning and came back negative. Per patient feels a little better but is still having cough, fatigue and headaches. Patient's main concern is trying to figure out if she has something that is contagious or anything else OTC that she can take to finish the sx.     Please advise.

## 2024-01-03 NOTE — TELEPHONE ENCOUNTER
Advise patient to submit an evisit      Appointment Request From: Rosa M Wells      With Provider: Xavier Church DO [[facility]]      Preferred Date Range: 1/4/2024 - 1/5/2024      Preferred Times: Any Time      Reason for visit: Office Visit      Comments:   Cough, headache, fatigue

## 2024-01-03 NOTE — TELEPHONE ENCOUNTER
Left a message for patient to call back - when did symptoms start? What are her current symptoms? Have they tested for covid? If not will need to do an at home test.

## 2024-01-04 ENCOUNTER — OFFICE VISIT (OUTPATIENT)
Dept: INTERNAL MEDICINE CLINIC | Age: 74
End: 2024-01-04
Payer: MEDICARE

## 2024-01-04 VITALS
WEIGHT: 93 LBS | RESPIRATION RATE: 14 BRPM | HEIGHT: 61 IN | HEART RATE: 99 BPM | BODY MASS INDEX: 17.56 KG/M2 | TEMPERATURE: 98.4 F | SYSTOLIC BLOOD PRESSURE: 112 MMHG | DIASTOLIC BLOOD PRESSURE: 62 MMHG | OXYGEN SATURATION: 93 %

## 2024-01-04 DIAGNOSIS — J06.9 VIRAL URI WITH COUGH: Primary | ICD-10-CM

## 2024-01-04 PROCEDURE — 1123F ACP DISCUSS/DSCN MKR DOCD: CPT | Performed by: INTERNAL MEDICINE

## 2024-01-04 PROCEDURE — 99213 OFFICE O/P EST LOW 20 MIN: CPT | Performed by: INTERNAL MEDICINE

## 2024-01-04 RX ORDER — GUAIFENESIN/DEXTROMETHORPHAN 100-10MG/5
5 SYRUP ORAL
Qty: 120 ML | COMMUNITY
Start: 2024-01-04 | End: 2024-01-14

## 2024-01-04 RX ORDER — ECHINACEA PURPUREA EXTRACT 125 MG
3 TABLET ORAL 3 TIMES DAILY
COMMUNITY
Start: 2024-01-04

## 2024-01-04 RX ORDER — BENZONATATE 200 MG/1
200 CAPSULE ORAL 3 TIMES DAILY PRN
Qty: 30 CAPSULE | Refills: 0 | Status: SHIPPED | OUTPATIENT
Start: 2024-01-04 | End: 2024-01-14

## 2024-01-04 ASSESSMENT — PATIENT HEALTH QUESTIONNAIRE - PHQ9
SUM OF ALL RESPONSES TO PHQ QUESTIONS 1-9: 0
SUM OF ALL RESPONSES TO PHQ9 QUESTIONS 1 & 2: 0
1. LITTLE INTEREST OR PLEASURE IN DOING THINGS: NOT AT ALL
1. LITTLE INTEREST OR PLEASURE IN DOING THINGS: 0
SUM OF ALL RESPONSES TO PHQ9 QUESTIONS 1 & 2: 0
2. FEELING DOWN, DEPRESSED OR HOPELESS: NOT AT ALL
SUM OF ALL RESPONSES TO PHQ QUESTIONS 1-9: 0
2. FEELING DOWN, DEPRESSED OR HOPELESS: 0
SUM OF ALL RESPONSES TO PHQ QUESTIONS 1-9: 0
SUM OF ALL RESPONSES TO PHQ QUESTIONS 1-9: 0

## 2024-01-04 NOTE — PROGRESS NOTES
[facility] Physicians  Internal Medicine  Patient Encounter  Xavier Church D.O., [facility]        Chief Complaint   Patient presents with    sick     Monday evening, cough, feeling fatigued, Tuesday at home covid test negative, headache, today started the diarrhea, last night 101.4       HPI: 73 y.o. female seen today with complaints of cough, nasal congestion.  Symptoms started 3 days ago with a dry cough and headache.  She did not have a sore throat at that time.  On Tuesday morning she woke with similar symptoms.  She took a COVID test which was negative.  That evening she had chills and shivering and some mild body aches.  She states her thermometer did not register.  On Wednesday morning she felt a little better but she continued to have a cough and nasal congestion.  Last night she had a temperature of 101.4.  This morning she had a bout of diarrhea.  Her cough is worse when laying down.  She denies any shortness of breath.  She is using over-the-counter equate brand cold and flu medicine.  She states this does help    Past Medical History:   Diagnosis Date    Arthropathy of cervical facet joint     Cervical spondylosis     Chronic bilateral low back pain without sciatica 11/28/2016    Cystocele with prolapse     DJD (degenerative joint disease), cervical 10/13/2010    DJD (degenerative joint disease), lumbar 09/10/2010    Heart murmur 07/21/2020    Hyperlipidemia     Impaired fasting glucose 06/12/2018    Lumbar facet arthropathy     Mild mitral valve prolapse     MVP (mitral valve prolapse) 07/21/2020    Osteopenia     PONV (postoperative nausea and vomiting)     RLS (restless legs syndrome)     Scoliosis     Spondylolisthesis          MEDICATIONS:  Prior to Visit Medications    Medication Sig   traMADol (ULTRAM) 50 MG tablet Take 1 tablet by mouth daily for 30 days. May take 1 extra tab as needed daily. Max Daily Amount: 50 mg   pramipexole (MIRAPEX) 0.25 MG tablet Take 1.5 tablets by mouth

## 2024-01-05 LAB
FLUAV RNA UPPER RESP QL NAA+PROBE: POSITIVE
FLUBV AG NPH QL: NEGATIVE
SARS-COV-2 N GENE RESP QL NAA+PROBE: NOT DETECTED

## 2024-01-08 ENCOUNTER — PATIENT MESSAGE (OUTPATIENT)
Dept: INTERNAL MEDICINE CLINIC | Age: 74
End: 2024-01-08

## 2024-01-08 DIAGNOSIS — F40.243 ANXIETY WITH FLYING: ICD-10-CM

## 2024-01-08 RX ORDER — ALPRAZOLAM 0.25 MG/1
TABLET ORAL
Qty: 20 TABLET | Refills: 0 | Status: SHIPPED | OUTPATIENT
Start: 2024-01-08 | End: 2024-04-06

## 2024-01-13 LAB
REASON FOR REJECTION: NORMAL
REASON FOR REJECTION: NORMAL
REJECTED TEST: NORMAL
REJECTED TEST: NORMAL

## 2024-01-15 ENCOUNTER — TELEPHONE (OUTPATIENT)
Dept: INTERNAL MEDICINE CLINIC | Age: 74
End: 2024-01-15

## 2024-01-15 NOTE — TELEPHONE ENCOUNTER
Lydia with [facility] is calling back for Doris.  She spoke to patient and patient states she is feeling much better.She was negative for COVID and positive for Flu . If Doris needs to speak further to Lydia please call her at [phone].

## 2024-01-15 NOTE — TELEPHONE ENCOUNTER
Lydia from [facility] is calling in for  in regard to rejected lab for RSV/FLU AB, per lydia only note was in regards to being a lab accident and will call patient to re test.     Please send new lab orders for retesting.

## 2024-01-20 ENCOUNTER — PATIENT MESSAGE (OUTPATIENT)
Dept: INTERNAL MEDICINE CLINIC | Age: 74
End: 2024-01-20

## 2024-01-20 DIAGNOSIS — J06.9 VIRAL URI WITH COUGH: ICD-10-CM

## 2024-01-22 DIAGNOSIS — J06.9 VIRAL URI WITH COUGH: ICD-10-CM

## 2024-01-22 RX ORDER — BENZONATATE 200 MG/1
200 CAPSULE ORAL 3 TIMES DAILY PRN
Qty: 30 CAPSULE | Refills: 0 | Status: SHIPPED | OUTPATIENT
Start: 2024-01-22 | End: 2024-02-01

## 2024-01-22 RX ORDER — BENZONATATE 200 MG/1
CAPSULE ORAL
Qty: 30 CAPSULE | Refills: 0 | OUTPATIENT
Start: 2024-01-22

## 2024-01-22 NOTE — TELEPHONE ENCOUNTER
From: Rosa M Wells  To: Dr. Xavier Church  Sent: 1/20/2024 8:44 AM EST  Subject: Refill for Benzonatate    I will be leaving for [place] in a week. I’m feeling almost back to normal, but still have a lingering cough, mainly in the morning and late evening. The Benzonatate 200 MG capsules really help. Would I be able to get a refill to take with me? Thanks, Rosa M

## 2024-01-22 NOTE — TELEPHONE ENCOUNTER
Medication:   Requested Prescriptions     Pending Prescriptions Disp Refills    benzonatate (TESSALON) 200 MG capsule [Pharmacy Med Name: BENZONATATE 200 MG CAPSULE] 30 capsule 0     Sig: TAKE 1 CAPSULE BY MOUTH 3 TIMES A DAY AS NEEDED FOR COUGH        Last Filled:    1/4/24  Patient Phone Number: [phone] (home)     Last appt: 1/4/2024   Next appt: 3/15/2024    Last OARRS:       12/18/2023     5:17 PM   RX Monitoring   Periodic Controlled Substance Monitoring No signs of potential drug abuse or diversion identified.

## 2024-03-22 DIAGNOSIS — G25.81 RLS (RESTLESS LEGS SYNDROME): ICD-10-CM

## 2024-03-27 DIAGNOSIS — G25.81 RLS (RESTLESS LEGS SYNDROME): ICD-10-CM

## 2024-03-27 RX ORDER — PRAMIPEXOLE DIHYDROCHLORIDE 0.25 MG/1
0.38 TABLET ORAL NIGHTLY
Qty: 135 TABLET | Refills: 0 | Status: SHIPPED | OUTPATIENT
Start: 2024-03-27

## 2024-03-27 RX ORDER — PRAMIPEXOLE DIHYDROCHLORIDE 0.25 MG/1
0.38 TABLET ORAL NIGHTLY
Qty: 135 TABLET | Refills: 0 | Status: SHIPPED | OUTPATIENT
Start: 2024-03-27 | End: 2024-03-27 | Stop reason: SDUPTHER

## 2024-03-28 DIAGNOSIS — G25.81 RLS (RESTLESS LEGS SYNDROME): ICD-10-CM

## 2024-03-28 DIAGNOSIS — M54.50 CHRONIC BILATERAL LOW BACK PAIN WITHOUT SCIATICA: ICD-10-CM

## 2024-03-28 DIAGNOSIS — G89.29 CHRONIC BILATERAL LOW BACK PAIN WITHOUT SCIATICA: ICD-10-CM

## 2024-03-28 RX ORDER — TRAMADOL HYDROCHLORIDE 50 MG/1
50 TABLET ORAL DAILY
Qty: 60 TABLET | Refills: 0 | Status: SHIPPED | OUTPATIENT
Start: 2024-03-28 | End: 2024-04-27

## 2024-03-28 NOTE — TELEPHONE ENCOUNTER
Medication:   Requested Prescriptions     Pending Prescriptions Disp Refills    traMADol (ULTRAM) 50 MG tablet [Pharmacy Med Name: traMADol HCL 50MG TABLET] 60 tablet      Sig: TAKE 1 TABLET BY MOUTH DAILY *MAY TAKE ONE EXTRA TABLET DAILY AS NEEDED     Last Filled:  # 60 on 12/18/23    Last appt: 1/4/2024   Next appt: 4/1/2024    Last OARRS:       12/18/2023     5:17 PM   RX Monitoring   Periodic Controlled Substance Monitoring No signs of potential drug abuse or diversion identified.

## 2024-03-29 SDOH — ECONOMIC STABILITY: TRANSPORTATION INSECURITY
IN THE PAST 12 MONTHS, HAS LACK OF TRANSPORTATION KEPT YOU FROM MEETINGS, WORK, OR FROM GETTING THINGS NEEDED FOR DAILY LIVING?: NO

## 2024-03-29 SDOH — ECONOMIC STABILITY: INCOME INSECURITY: HOW HARD IS IT FOR YOU TO PAY FOR THE VERY BASICS LIKE FOOD, HOUSING, MEDICAL CARE, AND HEATING?: NOT HARD AT ALL

## 2024-03-29 SDOH — ECONOMIC STABILITY: FOOD INSECURITY: WITHIN THE PAST 12 MONTHS, YOU WORRIED THAT YOUR FOOD WOULD RUN OUT BEFORE YOU GOT MONEY TO BUY MORE.: NEVER TRUE

## 2024-03-29 SDOH — ECONOMIC STABILITY: FOOD INSECURITY: WITHIN THE PAST 12 MONTHS, THE FOOD YOU BOUGHT JUST DIDN'T LAST AND YOU DIDN'T HAVE MONEY TO GET MORE.: NEVER TRUE

## 2024-04-01 ENCOUNTER — OFFICE VISIT (OUTPATIENT)
Dept: INTERNAL MEDICINE CLINIC | Age: 74
End: 2024-04-01
Payer: MEDICARE

## 2024-04-01 VITALS
HEART RATE: 72 BPM | DIASTOLIC BLOOD PRESSURE: 68 MMHG | BODY MASS INDEX: 17.57 KG/M2 | WEIGHT: 93 LBS | OXYGEN SATURATION: 97 % | SYSTOLIC BLOOD PRESSURE: 124 MMHG | TEMPERATURE: 97.2 F

## 2024-04-01 DIAGNOSIS — I25.10 CORONARY ARTERY CALCIFICATION SEEN ON CAT SCAN: ICD-10-CM

## 2024-04-01 DIAGNOSIS — M54.50 CHRONIC BILATERAL LOW BACK PAIN WITHOUT SCIATICA: ICD-10-CM

## 2024-04-01 DIAGNOSIS — G25.81 RLS (RESTLESS LEGS SYNDROME): Primary | ICD-10-CM

## 2024-04-01 DIAGNOSIS — G89.29 CHRONIC BILATERAL LOW BACK PAIN WITHOUT SCIATICA: ICD-10-CM

## 2024-04-01 DIAGNOSIS — R73.01 IMPAIRED FASTING GLUCOSE: ICD-10-CM

## 2024-04-01 DIAGNOSIS — R73.02 IMPAIRED GLUCOSE TOLERANCE: ICD-10-CM

## 2024-04-01 DIAGNOSIS — R93.1 AGATSTON CAC SCORE, <100: ICD-10-CM

## 2024-04-01 DIAGNOSIS — E78.00 HYPERCHOLESTEROLEMIA: ICD-10-CM

## 2024-04-01 PROCEDURE — 1123F ACP DISCUSS/DSCN MKR DOCD: CPT | Performed by: INTERNAL MEDICINE

## 2024-04-01 PROCEDURE — G2211 COMPLEX E/M VISIT ADD ON: HCPCS | Performed by: INTERNAL MEDICINE

## 2024-04-01 PROCEDURE — 99214 OFFICE O/P EST MOD 30 MIN: CPT | Performed by: INTERNAL MEDICINE

## 2024-04-01 RX ORDER — TRAMADOL HYDROCHLORIDE 50 MG/1
50 TABLET ORAL DAILY
Qty: 60 TABLET | Refills: 0 | Status: CANCELLED | OUTPATIENT
Start: 2024-04-01 | End: 2024-05-01

## 2024-04-01 NOTE — PROGRESS NOTES
[facility] Physicians  Internal Medicine  Patient Encounter  Xavier Church D.O., [facility]        Chief Complaint   Patient presents with    Check-Up     Patient rtc for 6 mo f/u. Denies any new c/o or concerns. Needs Tramadol refill.       HPI: 73 y.o. female seen today requesting a routine checkup regarding the status of current issues listed below along with a medication review and reconciliation.  She is requesting a refill on her tramadol    Chronic low back pain-- Previous history ----> Pain generators include Lumbar facet arthropathy.     Interval Hx---->She states she has good days and bad days.  She is able to perform her ADL's.  She can hike without difficulty, but she states the back can flare.  She will take a Tramadol 1/2-1 daily as needed.  She sometimes will not take one.  No adverse effects.      RLS--Well controlled.  She is doing well with Mirapex 0.25 mg 1 and 1/2 tabs nightly.  No adverse effects.      Hyperlipidemia:    Lab Results   Component Value Date    LDLCALC 126 06/15/2023   Patient remains compliant with pravastatin 20 mg nightly.    She tolerates this well.      Osteoporosis--patient has seen Dr. Fierro, endocrinology.  Prolia was recommended.  She has declined therapy and further eval.     Situational anxiety--She uses the Xanax when flying or for prolonged car rides.  This works well without adverse effects.  She gonzales snot drink alcohol with the medication.      CAD--diagnosed by virtue of coronary artery calcification seen on CT scan.  Coronary artery calcium score-7.  She remains on pravastatin.  Her lipid profile is a protective pattern.  Her HDL is very elevated.    IGT--asymptomatic  No polys.  No significant weight changes.  Hemoglobin A1C   Date Value Ref Range Status   09/15/2023 5.6 % Final        Past Medical History:   Diagnosis Date    Arthropathy of cervical facet joint     Cervical spondylosis     Chronic bilateral low back pain without sciatica 11/28/2016    Cystocele

## 2024-06-17 DIAGNOSIS — F40.243 ANXIETY WITH FLYING: ICD-10-CM

## 2024-06-17 DIAGNOSIS — G25.81 RLS (RESTLESS LEGS SYNDROME): ICD-10-CM

## 2024-06-17 RX ORDER — PRAMIPEXOLE DIHYDROCHLORIDE 0.25 MG/1
0.38 TABLET ORAL NIGHTLY
Qty: 135 TABLET | Refills: 0 | Status: CANCELLED | OUTPATIENT
Start: 2024-06-17

## 2024-06-17 NOTE — TELEPHONE ENCOUNTER
----- Message from Rosa M Wells sent at 6/17/2024  7:43 AM EDT -----  Regarding: Prescriptions  Contact: [phone]  Good morning. I am needing a refill for Pramipexole. I have had several nights with slight restless legs, so the last couple of nights I increased dosage to .5. This seemed to help. Is it ok to stay on that dosage?  Also we have 2 trips planned so I’m needing a few Xanax for travel. Thanks, Rosa M    Medication:   Requested Prescriptions     Pending Prescriptions Disp Refills    pramipexole (MIRAPEX) 0.25 MG tablet 135 tablet 0     Sig: Take 1.5 tablets by mouth nightly    ALPRAZolam (XANAX) 0.25 MG tablet 20 tablet 0     Sig: Take 1 tab 30-45 minutes before flight.  May repeat every 12 hours as needed for anxiety        Last Filled:   3/27/24, 1/8/24    Patient Phone Number: [phone] (home)     Last appt: 4/1/2024   Next appt: 10/7/2024    Last OARRS:       3/28/2024     8:00 PM   RX Monitoring   Periodic Controlled Substance Monitoring No signs of potential drug abuse or diversion identified.

## 2024-06-18 RX ORDER — ALPRAZOLAM 0.25 MG/1
TABLET ORAL
Qty: 20 TABLET | Refills: 0 | Status: SHIPPED | OUTPATIENT
Start: 2024-06-18 | End: 2024-09-14

## 2024-06-18 RX ORDER — PRAMIPEXOLE DIHYDROCHLORIDE 0.5 MG/1
0.5 TABLET ORAL NIGHTLY
Qty: 90 TABLET | Refills: 1 | Status: SHIPPED | OUTPATIENT
Start: 2024-06-18

## 2024-06-18 NOTE — TELEPHONE ENCOUNTER
Filled Mirapex 0.5 mg nightly and Xanax to use for anxiety while flying and as needed for anxiety.  Patient has tolerated the medication in the past despite usage of tramadol.  OARRS report checked

## 2024-06-29 DIAGNOSIS — M54.50 CHRONIC BILATERAL LOW BACK PAIN WITHOUT SCIATICA: ICD-10-CM

## 2024-06-29 DIAGNOSIS — G89.29 CHRONIC BILATERAL LOW BACK PAIN WITHOUT SCIATICA: ICD-10-CM

## 2024-07-01 RX ORDER — TRAMADOL HYDROCHLORIDE 50 MG/1
50 TABLET ORAL DAILY
Qty: 60 TABLET | Refills: 2 | Status: SHIPPED | OUTPATIENT
Start: 2024-07-01 | End: 2024-09-29

## 2024-08-15 RX ORDER — PRAVASTATIN SODIUM 20 MG
TABLET ORAL
Qty: 90 TABLET | Refills: 3 | Status: SHIPPED | OUTPATIENT
Start: 2024-08-15

## 2024-08-15 NOTE — TELEPHONE ENCOUNTER
Last appointment: 4/1/2024  Next appointment: 10/7/2024  Last refill: 8/22/23    Requested Prescriptions     Pending Prescriptions Disp Refills    pravastatin (PRAVACHOL) 20 MG tablet [Pharmacy Med Name: PRAVASTATIN SODIUM 20 MG TAB] 90 tablet 3     Sig: TAKE ONE TABLET BY MOUTH ONCE NIGHTLY

## 2024-10-07 ENCOUNTER — OFFICE VISIT (OUTPATIENT)
Dept: INTERNAL MEDICINE CLINIC | Age: 74
End: 2024-10-07
Payer: MEDICARE

## 2024-10-07 VITALS
SYSTOLIC BLOOD PRESSURE: 118 MMHG | BODY MASS INDEX: 18.75 KG/M2 | DIASTOLIC BLOOD PRESSURE: 68 MMHG | WEIGHT: 93 LBS | OXYGEN SATURATION: 99 % | HEIGHT: 59 IN | HEART RATE: 73 BPM

## 2024-10-07 DIAGNOSIS — Z00.00 ANNUAL PHYSICAL EXAM: Primary | ICD-10-CM

## 2024-10-07 DIAGNOSIS — I25.10 CORONARY ARTERY CALCIFICATION SEEN ON CAT SCAN: ICD-10-CM

## 2024-10-07 DIAGNOSIS — R73.02 IMPAIRED GLUCOSE TOLERANCE: ICD-10-CM

## 2024-10-07 DIAGNOSIS — Z23 NEED FOR VACCINATION: ICD-10-CM

## 2024-10-07 DIAGNOSIS — Z23 NEED FOR TDAP VACCINATION: ICD-10-CM

## 2024-10-07 DIAGNOSIS — M54.50 CHRONIC BILATERAL LOW BACK PAIN WITHOUT SCIATICA: ICD-10-CM

## 2024-10-07 DIAGNOSIS — G89.29 CHRONIC BILATERAL LOW BACK PAIN WITHOUT SCIATICA: ICD-10-CM

## 2024-10-07 DIAGNOSIS — R73.01 IMPAIRED FASTING GLUCOSE: ICD-10-CM

## 2024-10-07 DIAGNOSIS — M47.816 LUMBAR FACET ARTHROPATHY: ICD-10-CM

## 2024-10-07 DIAGNOSIS — G25.81 RLS (RESTLESS LEGS SYNDROME): ICD-10-CM

## 2024-10-07 PROCEDURE — 90653 IIV ADJUVANT VACCINE IM: CPT | Performed by: INTERNAL MEDICINE

## 2024-10-07 PROCEDURE — G0008 ADMIN INFLUENZA VIRUS VAC: HCPCS | Performed by: INTERNAL MEDICINE

## 2024-10-07 PROCEDURE — 99397 PER PM REEVAL EST PAT 65+ YR: CPT | Performed by: INTERNAL MEDICINE

## 2024-10-07 RX ORDER — TRAMADOL HYDROCHLORIDE 50 MG/1
50 TABLET ORAL DAILY
Qty: 60 TABLET | Refills: 2
Start: 2024-10-07 | End: 2025-01-05

## 2024-10-07 NOTE — PATIENT INSTRUCTIONS
Preventive plan of care for Rosa M Wells        10/7/2024           Preventive Measures Status       Recommendations for screening   Colon Cancer Screen   Last colonoscopy: 6/29/2016 Test is due every 10 years.  Next colonoscopy due 2026   Breast Cancer Screen  Last mammogram: 1/12/2024 Test is due yearly.     Cervical Cancer Screen   Last PAP smear: 1998 Repeat screening is not clinically indicated due to hysterectomy status.     Osteoporosis Screen   Last DXA scan: 6/29/2018.  Declined treatment for Osteoporosis Declined testing   Diabetes Screen  Glucose (mg/dL)   Date Value   06/15/2023 96    Check twice yearly   Cholesterol Screen  Lab Results   Component Value Date    CHOL 258 06/15/2023    TRIG 86 06/15/2023     (A) 06/15/2023    Check twice yearly   Hepatitis C screening recommended for those born between 1945 and 1965--4/20/2016   No need to repeat at this time   Aspirin for Cardiovascular Prevention   Yes Continue a baby aspirin daily for cardiovascular disease prevention    Recommended Immunizations    Immunization History   Administered Date(s) Administered    COVID-19, MODERNA BLUE border, Primary or Immunocompromised, (age 12y+), IM, 100 mcg/0.5mL 02/03/2021, 03/04/2021    COVID-19, MODERNA Booster BLUE border, (age 18y+), IM, 50mcg/0.25mL 11/14/2021, 07/15/2022, 10/09/2023    Influenza Virus Vaccine 02/13/2014, 10/14/2015, 10/29/2018    Influenza, FLUAD, (age 65 y+), IM, Quadv, 0.5mL 09/15/2023    Influenza, FLUAD, (age 65 y+), IM, Trivalent PF, 0.5mL 10/07/2024    Influenza, FLUARIX, FLULAVAL, FLUZONE (age 6 mo+) and AFLURIA, (age 3 y+), Quadv PF, 0.5mL 11/28/2016, 10/29/2018    Influenza, FLUZONE High Dose (age 65 y+), IM, Quadv, 0.7mL 10/24/2022    Influenza, FLUZONE High Dose, (age 65 y+), IM, Trivalent PF, 0.5mL 12/12/2017, 10/29/2018, 10/05/2020, 10/31/2021    Pneumococcal, PCV-13, PREVNAR 13, (age 6w+), IM, 0.5mL 05/31/2017    Pneumococcal, PPSV23, PNEUMOVAX 23, (age 2y+), SC/IM, 0.5mL

## 2024-10-07 NOTE — PROGRESS NOTES
Screen  Glucose (mg/dL)   Date Value   06/15/2023 96    Check twice yearly   Cholesterol Screen  Lab Results   Component Value Date    CHOL 258 06/15/2023    TRIG 86 06/15/2023     (A) 06/15/2023    Check twice yearly   Hepatitis C screening recommended for those born between 1945 and 1965--4/20/2016   No need to repeat at this time   Aspirin for Cardiovascular Prevention   Yes Continue a baby aspirin daily for cardiovascular disease prevention    Recommended Immunizations    Immunization History   Administered Date(s) Administered    COVID-19, MODERNA BLUE border, Primary or Immunocompromised, (age 12y+), IM, 100 mcg/0.5mL 02/03/2021, 03/04/2021    COVID-19, MODERNA Booster BLUE border, (age 18y+), IM, 50mcg/0.25mL 11/14/2021, 07/15/2022, 10/09/2023    Influenza Virus Vaccine 02/13/2014, 10/14/2015, 10/29/2018    Influenza, FLUAD, (age 65 y+), IM, Quadv, 0.5mL 09/15/2023    Influenza, FLUAD, (age 65 y+), IM, Trivalent PF, 0.5mL 10/07/2024    Influenza, FLUARIX, FLULAVAL, FLUZONE (age 6 mo+) and AFLURIA, (age 3 y+), Quadv PF, 0.5mL 11/28/2016, 10/29/2018    Influenza, FLUZONE High Dose (age 65 y+), IM, Quadv, 0.7mL 10/24/2022    Influenza, FLUZONE High Dose, (age 65 y+), IM, Trivalent PF, 0.5mL 12/12/2017, 10/29/2018, 10/05/2020, 10/31/2021    Pneumococcal, PCV-13, PREVNAR 13, (age 6w+), IM, 0.5mL 05/31/2017    Pneumococcal, PPSV23, PNEUMOVAX 23, (age 2y+), SC/IM, 0.5mL 06/12/2018    TDaP, ADACEL (age 10y-64y), BOOSTRIX (age 10y+), IM, 0.5mL 03/15/2013    Zoster Live (Zostavax) 11/13/2014    Zoster Recombinant (Shingrix) 04/08/2019, 08/04/2019        Influenza vaccine:  recommended every fall      Tetanus vaccine:  tetanus and diptheria/Pertussis vaccine (Td/Tdap) recommended every 10 years-due now    COVID-19 vaccine (3909-0290)-recommended    RSV Vaccine (Arexvy)-- Recommended.  Get at pharmacy             Additional Recommendations   1. Use Sunscreen daily to help reduce the risk of skin cancer  2.

## 2024-12-12 DIAGNOSIS — G25.81 RLS (RESTLESS LEGS SYNDROME): ICD-10-CM

## 2024-12-12 NOTE — TELEPHONE ENCOUNTER
Last appointment: 10/7/2024  Next appointment: 4/7/2025  Last refill:  5 months ago (6/18/2024) by Xavier Church DO   Requested Prescriptions     Pending Prescriptions Disp Refills    pramipexole (MIRAPEX) 0.5 MG tablet [Pharmacy Med Name: PRAMIPEXOLE 0.5 MG TABLET] 90 tablet 1     Sig: TAKE ONE TABLET BY MOUTH ONCE NIGHTLY

## 2024-12-13 RX ORDER — PRAMIPEXOLE DIHYDROCHLORIDE 0.5 MG/1
0.5 TABLET ORAL NIGHTLY
Qty: 90 TABLET | Refills: 1 | Status: SHIPPED | OUTPATIENT
Start: 2024-12-13

## 2025-01-24 ENCOUNTER — PATIENT MESSAGE (OUTPATIENT)
Dept: INTERNAL MEDICINE CLINIC | Age: 75
End: 2025-01-24

## 2025-01-25 DIAGNOSIS — G89.29 CHRONIC BILATERAL LOW BACK PAIN WITHOUT SCIATICA: ICD-10-CM

## 2025-01-25 DIAGNOSIS — M54.50 CHRONIC BILATERAL LOW BACK PAIN WITHOUT SCIATICA: ICD-10-CM

## 2025-01-27 RX ORDER — TRAMADOL HYDROCHLORIDE 50 MG/1
50 TABLET ORAL 2 TIMES DAILY PRN
Qty: 60 TABLET | Refills: 2 | Status: SHIPPED | OUTPATIENT
Start: 2025-01-27 | End: 2025-04-27

## 2025-01-28 ENCOUNTER — TELEPHONE (OUTPATIENT)
Dept: INTERNAL MEDICINE CLINIC | Age: 75
End: 2025-01-28

## 2025-01-28 DIAGNOSIS — F40.243 ANXIETY WITH FLYING: ICD-10-CM

## 2025-01-28 RX ORDER — ALPRAZOLAM 0.25 MG/1
TABLET ORAL
Qty: 30 TABLET | Refills: 0 | Status: SHIPPED | OUTPATIENT
Start: 2025-01-28 | End: 2025-04-28

## 2025-01-28 NOTE — TELEPHONE ENCOUNTER
[facility] is calling in for  in regards to needing clarification on ALPRAZolam (XANAX) 0.25 MG that was sent. Please call pharmacy with verbal .    [id number] - [address] [phone] - F [phone]  [address] [place] [address]  Phone: [phone]  Fax: [phone]

## 2025-04-02 ENCOUNTER — RESULTS FOLLOW-UP (OUTPATIENT)
Dept: INTERNAL MEDICINE CLINIC | Age: 75
End: 2025-04-02

## 2025-04-04 SDOH — ECONOMIC STABILITY: FOOD INSECURITY: WITHIN THE PAST 12 MONTHS, THE FOOD YOU BOUGHT JUST DIDN'T LAST AND YOU DIDN'T HAVE MONEY TO GET MORE.: NEVER TRUE

## 2025-04-04 SDOH — ECONOMIC STABILITY: FOOD INSECURITY: WITHIN THE PAST 12 MONTHS, YOU WORRIED THAT YOUR FOOD WOULD RUN OUT BEFORE YOU GOT MONEY TO BUY MORE.: NEVER TRUE

## 2025-04-04 SDOH — ECONOMIC STABILITY: INCOME INSECURITY: IN THE LAST 12 MONTHS, WAS THERE A TIME WHEN YOU WERE NOT ABLE TO PAY THE MORTGAGE OR RENT ON TIME?: NO

## 2025-04-04 SDOH — ECONOMIC STABILITY: TRANSPORTATION INSECURITY
IN THE PAST 12 MONTHS, HAS THE LACK OF TRANSPORTATION KEPT YOU FROM MEDICAL APPOINTMENTS OR FROM GETTING MEDICATIONS?: NO

## 2025-04-04 ASSESSMENT — PATIENT HEALTH QUESTIONNAIRE - PHQ9
1. LITTLE INTEREST OR PLEASURE IN DOING THINGS: NOT AT ALL
SUM OF ALL RESPONSES TO PHQ QUESTIONS 1-9: 0
1. LITTLE INTEREST OR PLEASURE IN DOING THINGS: NOT AT ALL
SUM OF ALL RESPONSES TO PHQ QUESTIONS 1-9: 0
2. FEELING DOWN, DEPRESSED OR HOPELESS: NOT AT ALL
SUM OF ALL RESPONSES TO PHQ9 QUESTIONS 1 & 2: 0
2. FEELING DOWN, DEPRESSED OR HOPELESS: NOT AT ALL

## 2025-04-07 ENCOUNTER — OFFICE VISIT (OUTPATIENT)
Dept: INTERNAL MEDICINE CLINIC | Age: 75
End: 2025-04-07
Payer: MEDICARE

## 2025-04-07 VITALS
WEIGHT: 93 LBS | SYSTOLIC BLOOD PRESSURE: 120 MMHG | OXYGEN SATURATION: 94 % | BODY MASS INDEX: 18.78 KG/M2 | DIASTOLIC BLOOD PRESSURE: 60 MMHG | HEART RATE: 70 BPM | TEMPERATURE: 97.2 F

## 2025-04-07 DIAGNOSIS — M41.9 SCOLIOSIS OF THORACOLUMBAR SPINE, UNSPECIFIED SCOLIOSIS TYPE: ICD-10-CM

## 2025-04-07 DIAGNOSIS — I25.10 CORONARY ARTERY CALCIFICATION SEEN ON CAT SCAN: ICD-10-CM

## 2025-04-07 DIAGNOSIS — R73.01 IMPAIRED FASTING GLUCOSE: ICD-10-CM

## 2025-04-07 DIAGNOSIS — F40.243 ANXIETY WITH FLYING: ICD-10-CM

## 2025-04-07 DIAGNOSIS — G25.81 RLS (RESTLESS LEGS SYNDROME): ICD-10-CM

## 2025-04-07 DIAGNOSIS — R93.1 AGATSTON CAC SCORE, <100: ICD-10-CM

## 2025-04-07 DIAGNOSIS — G89.29 CHRONIC BILATERAL LOW BACK PAIN WITHOUT SCIATICA: Primary | ICD-10-CM

## 2025-04-07 DIAGNOSIS — E78.00 HYPERCHOLESTEROLEMIA: ICD-10-CM

## 2025-04-07 DIAGNOSIS — M54.50 CHRONIC BILATERAL LOW BACK PAIN WITHOUT SCIATICA: Primary | ICD-10-CM

## 2025-04-07 PROCEDURE — 99214 OFFICE O/P EST MOD 30 MIN: CPT | Performed by: INTERNAL MEDICINE

## 2025-04-07 PROCEDURE — 1159F MED LIST DOCD IN RCRD: CPT | Performed by: INTERNAL MEDICINE

## 2025-04-07 PROCEDURE — G2211 COMPLEX E/M VISIT ADD ON: HCPCS | Performed by: INTERNAL MEDICINE

## 2025-04-07 PROCEDURE — 1123F ACP DISCUSS/DSCN MKR DOCD: CPT | Performed by: INTERNAL MEDICINE

## 2025-04-07 NOTE — PROGRESS NOTES
[facility] Physicians  Internal Medicine  Patient Encounter  Xavier Church D.O., [facility]        Chief Complaint   Patient presents with    Check-Up     6 mo       HPI: 74 y.o. female seen today requesting a routine checkup regarding the status of current issues listed below along with a medication review and reconciliation.  She is requesting a refill on her tramadol    Chronic low back pain/Scoliosis-- Previous history ----> Pain generators include Lumbar facet arthropathy.     Interval Hx---->She states she has good days and bad days.  She is able to perform her ADL's.  She states her back pain is much better with Piliates and Yoga.  She may use 1/2 tab per day.  She denies adverse effects.  She denies radicular pain or paresthesias.  She denies bowel. or bladder problems.      RLS--Well controlled.  She is doing well with Mirapex 0.25 mg 1 and 1/2 tabs nightly.  No adverse effects.  She still has sleep issues.  The Restless legs are better.  Her mind races at night.  She is able to fall asleep, but she can wake and she has difficulty getting back to sleep.  She does not act out her dreams.      Hyperlipidemia:    Lab Results   Component Value Date     06/15/2023   Lipid profile on 9/23/2024:  Total cholesterol 247  Triglyceride 111    LDL calculated 118  She remains on pravastatin.    Osteoporosis--patient has seen Dr. Fierro, endocrinology.  Prolia was recommended.  She has declined therapy and further eval.     Situational anxiety--She uses the Xanax when flying or for prolonged car rides.  This works well without adverse effects.  She does not drink alcohol with the medication.      CAD--diagnosed by virtue of coronary artery calcification seen on CT scan 4/19/2023.  Coronary artery calcium score-7.  She remains on pravastatin.  Her lipid profile is a protective pattern with a high HDL.  She has been exercising--walking 2 miles per day, yoga, Pilates.      IGT--her last A1c on 9/23/2024 was

## 2025-06-08 DIAGNOSIS — G25.81 RLS (RESTLESS LEGS SYNDROME): ICD-10-CM

## 2025-06-09 RX ORDER — PRAMIPEXOLE DIHYDROCHLORIDE 0.5 MG/1
0.5 TABLET ORAL NIGHTLY
Qty: 90 TABLET | Refills: 1 | Status: SHIPPED | OUTPATIENT
Start: 2025-06-09

## 2025-06-09 NOTE — TELEPHONE ENCOUNTER
Last appointment: 4/7/2025  Next appointment: 10/13/2025        Last refill: (12/13/2024) by Xavier Church DO

## 2025-08-08 RX ORDER — PRAVASTATIN SODIUM 20 MG
20 TABLET ORAL NIGHTLY
Qty: 90 TABLET | Refills: 1 | Status: SHIPPED | OUTPATIENT
Start: 2025-08-08

## 2025-08-14 DIAGNOSIS — R73.01 IMPAIRED FASTING GLUCOSE: ICD-10-CM

## 2025-08-14 DIAGNOSIS — I25.10 CORONARY ARTERY CALCIFICATION SEEN ON CAT SCAN: ICD-10-CM

## 2025-08-14 DIAGNOSIS — E78.00 HYPERCHOLESTEROLEMIA: ICD-10-CM

## 2025-08-14 LAB
ALBUMIN SERPL-MCNC: 4.3 G/DL (ref 3.4–5)
ALBUMIN/GLOB SERPL: 1.8 {RATIO} (ref 1.1–2.2)
ALP SERPL-CCNC: 99 U/L (ref 40–129)
ALT SERPL-CCNC: 21 U/L (ref 10–40)
ANION GAP SERPL CALCULATED.3IONS-SCNC: 9 MMOL/L (ref 3–16)
AST SERPL-CCNC: 26 U/L (ref 15–37)
BILIRUB SERPL-MCNC: 0.4 MG/DL (ref 0–1)
BUN SERPL-MCNC: 22 MG/DL (ref 7–20)
CALCIUM SERPL-MCNC: 9.9 MG/DL (ref 8.3–10.6)
CHLORIDE SERPL-SCNC: 100 MMOL/L (ref 99–110)
CHOLEST SERPL-MCNC: 235 MG/DL (ref 0–199)
CO2 SERPL-SCNC: 27 MMOL/L (ref 21–32)
CREAT SERPL-MCNC: 0.7 MG/DL (ref 0.6–1.2)
EST. AVERAGE GLUCOSE BLD GHB EST-MCNC: 116.9 MG/DL
GFR SERPLBLD CREATININE-BSD FMLA CKD-EPI: >90 ML/MIN/{1.73_M2}
GLUCOSE SERPL-MCNC: 89 MG/DL (ref 70–99)
HBA1C MFR BLD: 5.7 %
HDLC SERPL-MCNC: 104 MG/DL (ref 40–60)
LDLC SERPL CALC-MCNC: 107 MG/DL
POTASSIUM SERPL-SCNC: 4.1 MMOL/L (ref 3.5–5.1)
PROT SERPL-MCNC: 6.7 G/DL (ref 6.4–8.2)
SODIUM SERPL-SCNC: 136 MMOL/L (ref 136–145)
TRIGL SERPL-MCNC: 118 MG/DL (ref 0–150)
TSH SERPL DL<=0.005 MIU/L-ACNC: 1.16 UIU/ML (ref 0.27–4.2)
VLDLC SERPL CALC-MCNC: 24 MG/DL

## 2025-08-15 DIAGNOSIS — M54.50 CHRONIC BILATERAL LOW BACK PAIN WITHOUT SCIATICA: ICD-10-CM

## 2025-08-15 DIAGNOSIS — G89.29 CHRONIC BILATERAL LOW BACK PAIN WITHOUT SCIATICA: ICD-10-CM

## 2025-08-18 RX ORDER — TRAMADOL HYDROCHLORIDE 50 MG/1
50 TABLET ORAL 2 TIMES DAILY
Qty: 60 TABLET | Refills: 2 | Status: SHIPPED | OUTPATIENT
Start: 2025-08-18 | End: 2025-11-16